# Patient Record
Sex: FEMALE | Race: WHITE | NOT HISPANIC OR LATINO | Employment: OTHER | URBAN - NONMETROPOLITAN AREA
[De-identification: names, ages, dates, MRNs, and addresses within clinical notes are randomized per-mention and may not be internally consistent; named-entity substitution may affect disease eponyms.]

---

## 2017-04-21 ENCOUNTER — OFFICE VISIT (OUTPATIENT)
Dept: CARDIOLOGY | Facility: CLINIC | Age: 82
End: 2017-04-21

## 2017-04-21 VITALS
SYSTOLIC BLOOD PRESSURE: 124 MMHG | DIASTOLIC BLOOD PRESSURE: 70 MMHG | HEIGHT: 63 IN | BODY MASS INDEX: 25.16 KG/M2 | HEART RATE: 85 BPM | WEIGHT: 142 LBS

## 2017-04-21 DIAGNOSIS — I44.2 COMPLETE HEART BLOCK (HCC): Primary | ICD-10-CM

## 2017-04-21 DIAGNOSIS — I49.5 SSS (SICK SINUS SYNDROME) (HCC): ICD-10-CM

## 2017-04-21 DIAGNOSIS — I48.20 CHRONIC ATRIAL FIBRILLATION (HCC): ICD-10-CM

## 2017-04-21 PROBLEM — Z95.0 PACEMAKER: Status: ACTIVE | Noted: 2017-04-21

## 2017-04-21 PROCEDURE — 93280 PM DEVICE PROGR EVAL DUAL: CPT | Performed by: PHYSICIAN ASSISTANT

## 2017-04-21 NOTE — PROGRESS NOTES
Primary Provider:  Abisai Talyor MD  CC:Afib    Problem List:  Patient Active Problem List   Diagnosis   • Paroxysmal atrial fibrillation   • Coronary artery disease involving native coronary artery of native heart without angina pectoris   • Chronic fatigue   • Essential hypertension   • Hypercholesteremia   • SSS (sick sinus syndrome)   • Ischemic heart disease   • H/O hiatal hernia   • Macular degeneration of both eyes   • Exertional dyspnea   • Tachyarrhythmia   • Complete heart block   • Chronic atrial fibrillation   • Pacemaker       HPI:  The patient presents for follow up regarding chronic afib, pacemaker.     Allergies   Allergen Reactions   • Codeine      Nausea and vomiting   • Lotrel [Amlodipine Besy-Benazepril Hcl]      cough   • Mobic [Meloxicam]    • Sular [Nisoldipine Er]    • Sulfa Antibiotics        Current Outpatient Prescriptions   Medication Sig Dispense Refill   • acetaminophen (TYLENOL) 500 MG tablet Take 500 mg by mouth every 6 (six) hours as needed for mild pain (1-3).     • albuterol (PROAIR HFA) 108 (90 BASE) MCG/ACT inhaler Inhale 2 puffs every 4 (four) hours as needed for wheezing.     • apixaban (ELIQUIS) 5 MG tablet tablet Take 5 mg by mouth 2 (Two) Times a Day.     • aspirin 81 MG EC tablet Take 81 mg by mouth daily.     • atorvastatin (LIPITOR) 40 MG tablet Take 40 mg by mouth daily.     • bisacodyl (FLEET) 10 MG/30ML enema Insert 10 mg into the rectum As Needed.     • carvedilol (COREG) 6.25 MG tablet Take 6.25 mg by mouth 2 (two) times a day with meals.     • Coenzyme Q10 (CO Q10) 60 MG capsule Take  by mouth daily.     • cyanocobalamin 1000 MCG/ML injection Inject 1,000 mcg into the shoulder, thigh, or buttocks every 28 (twenty-eight) days.     • Dextromethorphan-Guaifenesin (ROBITUSSIN DM SUGAR FREE PO) Take  by mouth Every 4 (Four) Hours.     • docusate sodium (COLACE) 250 MG capsule Take 250 mg by mouth daily.     • famotidine (PEPCID) 40 MG tablet Take 40 mg by mouth  "daily.     • ferrous sulfate 325 (65 FE) MG tablet Take 325 mg by mouth daily with breakfast.     • furosemide (LASIX) 40 MG tablet Take 40 mg by mouth 2 (Two) Times a Day.     • hydrALAZINE (APRESOLINE) 50 MG tablet Take 50 mg by mouth 2 (Two) Times a Day.     • lidocaine (LIDODERM) 5 % Place 1 patch on the skin every day. Remove & Discard patch within 12 hours or as directed by MD     • magnesium hydroxide (MILK OF MAGNESIA) 400 MG/5ML suspension Take  by mouth daily as needed for constipation.     • montelukast (SINGULAIR) 10 MG tablet Take 10 mg by mouth every night.     • polyethyl glycol-propyl glycol (SYSTANE) 0.4-0.3 % solution ophthalmic solution every 1 (one) hour as needed.     • prednisoLONE acetate (PRED FORTE) 1 % ophthalmic suspension 1 drop Daily.     • promethazine (PHENERGAN) 12.5 MG tablet 12.5 mg every 6 (six) hours as needed for nausea or vomiting.     • sodium chloride (OCEAN) 0.65 % nasal spray 1 spray into each nostril as needed for congestion.     • spironolactone (ALDACTONE) 25 MG tablet Take 25 mg by mouth Daily.     • valsartan (DIOVAN) 320 MG tablet Take 320 mg by mouth daily.       No current facility-administered medications for this visit.        /70 (BP Location: Right arm, Patient Position: Sitting)  Pulse 85  Ht 63\" (160 cm)  Wt 142 lb (64.4 kg)  LMP  (LMP Unknown)  BMI 25.15 kg/m2      Device Check    Company BSC  Mode DDDR  Lower Rate 75bpm  Upper rate 120bpm         Thresholds    Atrial Pacing [email or web address]  Atrial Sensing 1.6mV  Atrial Impedence 367Ohms  Percent Pacing 0    Right Ventricular Pacing 0.6Volts@0.5ms  Right Ventricular Sensing pacedmV  Right Ventricular Impedence 724Ohms  Percent Pacing 100        Battery Voltage -Volts  Longevity five years  Episodes 100% mode swithc    Reprogramming Change to VVIR Mode    Comments Stable pacemaker check      Diagnosis Plan   1. Complete heart block  Stable p[acemaker check.  Will return for follow up in 6 months.  "   2. SSS (sick sinus syndrome)     3. Chronic atrial fibrillation       Will Aubree PA-C

## 2017-06-22 ENCOUNTER — OFFICE VISIT (OUTPATIENT)
Dept: CARDIOLOGY | Facility: CLINIC | Age: 82
End: 2017-06-22

## 2017-06-22 ENCOUNTER — TELEPHONE (OUTPATIENT)
Dept: CARDIOLOGY | Facility: CLINIC | Age: 82
End: 2017-06-22

## 2017-06-22 VITALS — HEART RATE: 76 BPM | DIASTOLIC BLOOD PRESSURE: 56 MMHG | SYSTOLIC BLOOD PRESSURE: 88 MMHG

## 2017-06-22 DIAGNOSIS — Z95.0 PACEMAKER: ICD-10-CM

## 2017-06-22 DIAGNOSIS — I25.9 ISCHEMIC HEART DISEASE: ICD-10-CM

## 2017-06-22 DIAGNOSIS — I48.0 PAROXYSMAL ATRIAL FIBRILLATION (HCC): Primary | ICD-10-CM

## 2017-06-22 DIAGNOSIS — R06.02 SHORTNESS OF BREATH: ICD-10-CM

## 2017-06-22 DIAGNOSIS — E78.00 HYPERCHOLESTEREMIA: ICD-10-CM

## 2017-06-22 DIAGNOSIS — I10 ESSENTIAL HYPERTENSION: ICD-10-CM

## 2017-06-22 DIAGNOSIS — I49.5 SSS (SICK SINUS SYNDROME) (HCC): ICD-10-CM

## 2017-06-22 PROCEDURE — 99213 OFFICE O/P EST LOW 20 MIN: CPT | Performed by: NURSE PRACTITIONER

## 2017-06-22 PROCEDURE — 93000 ELECTROCARDIOGRAM COMPLETE: CPT | Performed by: NURSE PRACTITIONER

## 2017-06-22 RX ORDER — HYDRALAZINE HYDROCHLORIDE 100 MG/1
TABLET, FILM COATED ORAL
Qty: 90 TABLET | Refills: 2
Start: 2017-06-22 | End: 2017-06-22 | Stop reason: DRUGHIGH

## 2017-06-22 RX ORDER — HYDRALAZINE HYDROCHLORIDE 50 MG/1
50 TABLET, FILM COATED ORAL DAILY
Qty: 30 TABLET | Refills: 0 | Status: SHIPPED | OUTPATIENT
Start: 2017-06-22 | End: 2018-07-02 | Stop reason: DRUGHIGH

## 2017-06-22 RX ORDER — BISACODYL 10 MG
10 SUPPOSITORY, RECTAL RECTAL DAILY
COMMUNITY

## 2017-06-22 NOTE — TELEPHONE ENCOUNTER
[facility] called to clarify hydralazine order.  Per Lynn, decrease to 50 mg Daily.

## 2017-06-22 NOTE — PROGRESS NOTES
"Chief Complaint   Patient presents with   • Follow-up     6 month follow-up, PPM last checked 4/21/17 per Dr. Monzon, unable to weigh patient due to patient in wheelchair.   • Shortness of Breath     \"some shortness of breath'       Cardiac Complaints  none      Subjective   Shameka Aguilera is a 84 y.o. female was in the hospital earlier this year with increasing shortness of breath, elevated blood pressure and evidence of pulmonary congestion. Her cardiac enzyme were elevated. Cardiac catheterization showed significant lesion of the RCA and underwent stenting. Her LV function was still within normal limit and she did have moderate MR. She was discharged to nursing home.. Medication management included Plavix and aspirin. She was started on Eliquis by Dr. Monzon and Plavix stopped. She denies signs of bleeding or reoccurrence of significant cardiac symptoms. She does continue to have some shortness of breath, but this has been the same for sometime.  Recent pacemaker check with Dr. Monzon in April showed a 100% mode switching and she was swtiched to VVIR mode, he will follow again in 6 months. She denies any recent blood work, or does not recall when it was done.  No refills are needed.        Cardiac History  Past Surgical History:   Procedure Laterality Date   • ANGIOPLASTY      Remote, atypical, disabling chest pain syndrome with catheterization studies demonstrating severe 2-vessel obstructive coronary artery disease with acceptable left ventricular function with LVEF 0.75 and subsequent angioplasty and stent deployment (LAD-LCX), April 2001.   • APPENDECTOMY     • CARDIAC CATHETERIZATION  04/15/2016    75% RCA- 4.0x8 & 2.5x16 Promus   • CARDIAC CATHETERIZATION  04/2001    Cath-PTCA % Stents in LAD & LCX.   • CARDIAC CATHETERIZATION  02/2003    Cath-Brachytherapy to LCX.   • CARDIAC CATHETERIZATION  02/2004    Cath-PTCA & Stent in LCX.   • CARDIAC CATHETERIZATION  03/12/2008    Cath-705 [id number] " Taxus. 75%RCA-3.5x16mm Taxus.   • CARDIOVASCULAR STRESS TEST  03/07/2013    L.Myoview-negative for ischemia   • CARDIOVASCULAR STRESS TEST  05/02/2008    A. Cardiolyte-([facility]) Negative.   • CHOLECYSTECTOMY      remote   • CONVERTED (HISTORICAL) HOLTER  08/05/2006    Holter-Paced rythm at 78BPM.   • ECHO - CONVERTED  01/17/2007    Echo-EF65%. ApicalWMA.   • ECHO - CONVERTED  04/20/2009    Echo-EF>60%   • ECHO - CONVERTED  03/07/2013    Echo-EF 60-65%, RVSP 47mmHg   • ECHO - CONVERTED  10/31/2011    Echo-EF 60%, RVSP-46mmHg, Mod mR.   • EYE SURGERY Left     Corneal Implant   • OTHER SURGICAL HISTORY      colon polypectomies x 4.   • PACEMAKER IMPLANTATION  02/14/2005    Pacemaker-Guidant by Dr. Monzon   • PACEMAKER REPLACEMENT  10/07/2009    Pacemaker replacement of dual chamber pacemaker. Dr. Monzon.   • TOTAL ABDOMINAL HYSTERECTOMY WITH SALPINGO OOPHORECTOMY         Current Outpatient Prescriptions   Medication Sig Dispense Refill   • acetaminophen (TYLENOL) 500 MG tablet Take 500 mg by mouth every 6 (six) hours as needed for mild pain (1-3).     • albuterol (PROAIR HFA) 108 (90 BASE) MCG/ACT inhaler Inhale 2 puffs every 4 (four) hours as needed for wheezing.     • apixaban (ELIQUIS) 5 MG tablet tablet Take 5 mg by mouth 2 (Two) Times a Day.     • aspirin 81 MG EC tablet Take 81 mg by mouth daily.     • atorvastatin (LIPITOR) 40 MG tablet Take 40 mg by mouth daily.     • bisacodyl (DULCOLAX) 10 MG suppository Insert 10 mg into the rectum Daily. As needed for constipation.     • bisacodyl (FLEET) 10 MG/30ML enema Insert 10 mg into the rectum As Needed.     • carvedilol (COREG) 6.25 MG tablet Take 6.25 mg by mouth 2 (two) times a day with meals.     • Coenzyme Q10 (CO Q10) 60 MG capsule Take  by mouth daily.     • cyanocobalamin 1000 MCG/ML injection Inject 1,000 mcg into the shoulder, thigh, or buttocks every 28 (twenty-eight) days.     • Dextromethorphan-Guaifenesin (ROBITUSSIN DM SUGAR FREE PO) Take  by mouth  Every 4 (Four) Hours.     • docusate sodium (COLACE) 250 MG capsule Take 250 mg by mouth daily.     • famotidine (PEPCID) 40 MG tablet Take 40 mg by mouth daily.     • ferrous sulfate 325 (65 FE) MG tablet Take 325 mg by mouth daily with breakfast.     • furosemide (LASIX) 40 MG tablet Take 40 mg by mouth Daily.     • lidocaine (LIDODERM) 5 % Place 1 patch on the skin every day. Remove & Discard patch within 12 hours or as directed by MD     • loperamide (IMODIUM) 1 MG/5ML solution Take 1 mg by mouth 2 (Two) Times a Day As Needed for Diarrhea (as needed for diarrhea.).     • magnesium hydroxide (MILK OF MAGNESIA) 400 MG/5ML suspension Take  by mouth daily as needed for constipation.     • montelukast (SINGULAIR) 10 MG tablet Take 10 mg by mouth every night.     • polyethyl glycol-propyl glycol (SYSTANE) 0.4-0.3 % solution ophthalmic solution every 1 (one) hour as needed.     • prednisoLONE acetate (PRED FORTE) 1 % ophthalmic suspension 1 drop Daily.     • promethazine (PHENERGAN) 12.5 MG tablet 12.5 mg every 6 (six) hours as needed for nausea or vomiting.     • sodium chloride (OCEAN) 0.65 % nasal spray 1 spray into each nostril as needed for congestion.     • spironolactone (ALDACTONE) 25 MG tablet Take 25 mg by mouth Daily.     • valsartan (DIOVAN) 320 MG tablet Take 320 mg by mouth daily.     • hydrALAZINE (APRESOLINE) 50 MG tablet Take 1 tablet by mouth Daily. 30 tablet 0     No current facility-administered medications for this visit.        Codeine; Lotrel [amlodipine besy-benazepril hcl]; Mobic [meloxicam]; Sular [nisoldipine er]; and Sulfa antibiotics    Past Medical History:   Diagnosis Date   • Abnormal results of thyroid function studies     Remote   • Circumoral numbness     Intermittent transient facial paresthesias- TIA?.    • Complete heart block     w/ normal functioning pacemaker   • Dyslipidemia    • Exertional dyspnea     Chronic exertional dyspnea and fatigue with acceptable normal low  probability lung perfusion/ventilation scan, February 2003.    • Fatigue     Generalized fatigue of unclear etiology, however, this maybe secondary to lung function and advanced age and not due to deficit in cardiac function.  However, we will make arrangements for patient to obtain a 2D echo for evaluation of ejection fraction with Dr. Jerome here in the office.  Otherwise, we will plan on replacing her generator in approximately 1 months time.   • H/O hiatal hernia     Probable GERD syndrome   • Hiatal hernia    • Hypercholesterolemia    • Hypertension     Chronic hypertension, probably essential with minimal obstructive plaque on selective renal arteriography, February 2003.    • IHD (ischemic heart disease)    • Ischemic heart disease    • Knee pain, left     Recent severe left knee pain, swelling and immobility with serial normal left lower extremity venous duplex studies with apparent abnormal x-ray - data deficit (On license of [facility], May-June 2005).    • Macular degeneration of both eyes    • SOB (shortness of breath)    • IZA (stress urinary incontinence, female)    • Tachyarrhythmia     Intermittent recurrent tachypalpitations with documented rapid symptomatic atrial fibrillation with subsequent DDD/R pacemaker implant and AV node ablation (Guidant model #1298), February 2005.   • TIA (transient ischemic attack)     Remote TIA with apparent acceptable carotid duplex study and brain MRI scan, summer 2002.        Social History     Social History   • Marital status:      Spouse name: N/A   • Number of children: N/A   • Years of education: N/A     Occupational History   • Not on file.     Social History Main Topics   • Smoking status: Former Smoker     Quit date: 1966   • Smokeless tobacco: Never Used   • Alcohol use No   • Drug use: No   • Sexual activity: Defer     Other Topics Concern   • Not on file     Social History Narrative       Family History   Problem Relation Age of  Onset   • Diabetes Mother    • Heart disease Mother    • Stroke Mother    • Hypertension Mother        Review of Systems   Constitutional: Negative.    HENT: Negative.    Respiratory: Positive for shortness of breath.    Cardiovascular: Negative.    Gastrointestinal: Negative.    Skin: Negative.    Neurological: Negative.    Psychiatric/Behavioral: Negative.        DiabetesNo  Thyroidnormal    Objective     BP (!) 88/56 (BP Location: Left arm)  Pulse 76    Physical Exam   Constitutional: She is oriented to person, place, and time. She appears well-developed and well-nourished.   HENT:   Head: Normocephalic and atraumatic.   Eyes: EOM are normal. Pupils are equal, round, and reactive to light.   Neck: Normal range of motion. Neck supple.   Cardiovascular: Normal rate.  An irregular rhythm present.   Murmur heard.  Pulmonary/Chest: Effort normal and breath sounds normal.   Abdominal: Soft.   Musculoskeletal: Normal range of motion.   Neurological: She is alert and oriented to person, place, and time.   Skin: Skin is warm and dry.   Psychiatric: She has a normal mood and affect.         ECG 12 Lead  Date/Time: 6/22/2017 2:35 PM  Performed by: JOHNNIE POOL  Authorized by: JOHNNIE POOL   Rhythm: atrial fibrillation and paced  BPM: 75  Clinical impression: abnormal ECG            Assessment/Plan     HR is stable today.  BP is low, we will advise to decrease her hydralazine to ( 1) 50 mg tablet daily.  EKG done today for atrial fibrillation/SSS shows a ventricular paced rhythm and underlying atrial fibrillation.  It is followed by Dr. Monzon.  We will not advise on any repeat workup at present as no new concerns voiced.  She does have some shortness of breath but unchanged from prior.  She is unsure of recent labs, but she thinks you are following.  Could we get next copy?  Refills with you.  Good cardiac diet and limited sodium intake advised.  Please continue with 3 times a week daily weights.  6 month follow  up advised or sooner if needed.      Problems Addressed this Visit        Cardiovascular and Mediastinum    Paroxysmal atrial fibrillation - Primary    Relevant Orders    ECG 12 Lead    Essential hypertension    Hypercholesteremia    SSS (sick sinus syndrome)    Relevant Orders    ECG 12 Lead    Ischemic heart disease    Pacemaker      Other Visit Diagnoses     Shortness of breath                      Electronically signed by ALEYDA King June 22, 2017 4:10 PM

## 2017-12-15 ENCOUNTER — OFFICE VISIT (OUTPATIENT)
Dept: CARDIOLOGY | Facility: CLINIC | Age: 82
End: 2017-12-15

## 2017-12-15 VITALS
HEIGHT: 55 IN | HEART RATE: 64 BPM | SYSTOLIC BLOOD PRESSURE: 100 MMHG | WEIGHT: 143 LBS | BODY MASS INDEX: 33.09 KG/M2 | DIASTOLIC BLOOD PRESSURE: 64 MMHG

## 2017-12-15 DIAGNOSIS — I48.20 CHRONIC ATRIAL FIBRILLATION (HCC): ICD-10-CM

## 2017-12-15 DIAGNOSIS — Z95.0 PACEMAKER: Primary | ICD-10-CM

## 2017-12-15 DIAGNOSIS — I44.2 COMPLETE HEART BLOCK (HCC): ICD-10-CM

## 2017-12-15 DIAGNOSIS — I10 ESSENTIAL HYPERTENSION: ICD-10-CM

## 2017-12-15 PROCEDURE — 93280 PM DEVICE PROGR EVAL DUAL: CPT | Performed by: INTERNAL MEDICINE

## 2017-12-15 PROCEDURE — 99213 OFFICE O/P EST LOW 20 MIN: CPT | Performed by: INTERNAL MEDICINE

## 2017-12-15 NOTE — PROGRESS NOTES
Subjective:   Shameka Aguilera  5/22/1933  [phone]      12/15/2017    [facility] CARDIOLOGY    Abisai Taylor MD  [address] [place], [place] [id number]    REFERRING DOCTOR: LACY Jerome      Patient ID: Shameka Aguilera is a 84 y.o. female.    Chief Complaint: Chronic Afib, PPM    Problem List:  Patient Active Problem List   Diagnosis   • Chronic atrial fibrillation   • Coronary artery disease involving native coronary artery of native heart without angina pectoris   • Chronic fatigue   • Essential hypertension   • Hypercholesteremia   • SSS (sick sinus syndrome)   • Ischemic heart disease   • H/O hiatal hernia   • Macular degeneration of both eyes   • Exertional dyspnea   • Tachyarrhythmia   • Complete heart block   • Chronic atrial fibrillation   • Pacemaker for CHB         Allergies   Allergen Reactions   • Codeine      Nausea and vomiting   • Lotrel [Amlodipine Besy-Benazepril Hcl]      cough   • Mobic [Meloxicam]    • Sular [Nisoldipine Er]    • Sulfa Antibiotics        Current Outpatient Prescriptions:   •  acetaminophen (TYLENOL) 500 MG tablet, Take 500 mg by mouth every 6 (six) hours as needed for mild pain (1-3)., Disp: , Rfl:   •  albuterol (PROAIR HFA) 108 (90 BASE) MCG/ACT inhaler, Inhale 2 puffs every 4 (four) hours as needed for wheezing., Disp: , Rfl:   •  apixaban (ELIQUIS) 5 MG tablet tablet, Take 5 mg by mouth 2 (Two) Times a Day., Disp: , Rfl:   •  aspirin 81 MG EC tablet, Take 81 mg by mouth daily., Disp: , Rfl:   •  atorvastatin (LIPITOR) 40 MG tablet, Take 40 mg by mouth daily., Disp: , Rfl:   •  bisacodyl (DULCOLAX) 10 MG suppository, Insert 10 mg into the rectum Daily. As needed for constipation., Disp: , Rfl:   •  bisacodyl (FLEET) 10 MG/30ML enema, Insert 10 mg into the rectum As Needed., Disp: , Rfl:   •  carvedilol (COREG) 6.25 MG tablet, Take 6.25 mg by mouth 2 (two) times a day with meals., Disp: , Rfl:   •  Coenzyme Q10 (CO Q10) 60 MG capsule,  Take  by mouth daily., Disp: , Rfl:   •  cyanocobalamin 1000 MCG/ML injection, Inject 1,000 mcg into the shoulder, thigh, or buttocks every 28 (twenty-eight) days., Disp: , Rfl:   •  Dextromethorphan-Guaifenesin (ROBITUSSIN DM SUGAR FREE PO), Take  by mouth Every 4 (Four) Hours., Disp: , Rfl:   •  docusate sodium (COLACE) 250 MG capsule, Take 250 mg by mouth daily., Disp: , Rfl:   •  famotidine (PEPCID) 40 MG tablet, Take 40 mg by mouth daily., Disp: , Rfl:   •  ferrous sulfate 325 (65 FE) MG tablet, Take 325 mg by mouth daily with breakfast., Disp: , Rfl:   •  furosemide (LASIX) 40 MG tablet, Take 40 mg by mouth Daily., Disp: , Rfl:   •  hydrALAZINE (APRESOLINE) 50 MG tablet, Take 1 tablet by mouth Daily., Disp: 30 tablet, Rfl: 0  •  lidocaine (LIDODERM) 5 %, Place 1 patch on the skin every day. Remove & Discard patch within 12 hours or as directed by MD, Disp: , Rfl:   •  loperamide (IMODIUM) 1 MG/5ML solution, Take 1 mg by mouth 2 (Two) Times a Day As Needed for Diarrhea (as needed for diarrhea.)., Disp: , Rfl:   •  magnesium hydroxide (MILK OF MAGNESIA) 400 MG/5ML suspension, Take  by mouth daily as needed for constipation., Disp: , Rfl:   •  montelukast (SINGULAIR) 10 MG tablet, Take 10 mg by mouth every night., Disp: , Rfl:   •  polyethyl glycol-propyl glycol (SYSTANE) 0.4-0.3 % solution ophthalmic solution, every 1 (one) hour as needed., Disp: , Rfl:   •  prednisoLONE acetate (PRED FORTE) 1 % ophthalmic suspension, 1 drop Daily., Disp: , Rfl:   •  promethazine (PHENERGAN) 12.5 MG tablet, 12.5 mg every 6 (six) hours as needed for nausea or vomiting., Disp: , Rfl:   •  sodium chloride (OCEAN) 0.65 % nasal spray, 1 spray into each nostril as needed for congestion., Disp: , Rfl:   •  spironolactone (ALDACTONE) 25 MG tablet, Take 25 mg by mouth Daily., Disp: , Rfl:   •  valsartan (DIOVAN) 320 MG tablet, Take 320 mg by mouth daily., Disp: , Rfl:     History of Present Illness  Patient is an 84 year old female here  "today for follow-up visit status post pacemaker in the past and for chronic atrial fibrillation.  Pacemaker was placed originally for complete heart block. Overall doing ok but having some left sided flank pain noted. In a wheelchair and leaves in a NH at that time.     No issues with chest pain, shortness of breath, fevers, chills, night sweats, PND, orthopnea or palpitations. No recent ER visits or hospital stays.      The following portions of the patient's history were reviewed and updated as appropriate: allergies, current medications, past family history, past medical history, past social history, past surgical history and problem list.    ROS   14 point ROS negative except as outlined in problem list, HPI and other parts of the note.      ECG 12 Lead  Date/Time: 12/15/2017 11:32 AM  Performed by: KAYLA KEYS  Authorized by: KAYLA KEYS   Rhythm: atrial fibrillation and paced  Comments:  HR 75 bpm.                Objective:       Vitals:    12/15/17 1057   BP: 100/64   BP Location: Left arm   Patient Position: Sitting   Pulse: 64   Weight: 64.9 kg (143 lb)   Height: 63 cm (24.8\")       GENERAL: elderly female in a wheelchair  HEENT: Normocephalic, atraumatic, PERRLA. Moist mucous membranes.  NECK: No JVD present at 30°. No carotid bruits auscultated.  LUNGS: Clear to auscultation.  CARDIOVASCULAR: Heart has a regular rate and rhythm. No murmurs, gallops or rubs noted.   ABDOMEN: Soft, nontender. Positive bowel sounds.  MUSCULOSKELETAL: No gross deformities. No clubbing, cyanosis, or lower extremity edema.  SKIN: Pink, warm  Neuro: Nonfocal exam. Gait intact  Ext: No edema or bruising  PPm site ok    The patient's old records including ambulatory rhythm recordings (ECGs, Holter/event monitor) were reviewed and discussed.      Lab Review:   Results for orders placed or performed during the hospital encounter of 05/25/16   Basic metabolic panel   Result Value Ref Range    Glucose 110 (H) 70 - 100 mg/dL    " BUN 22 9 - 23 mg/dL    Creatinine 1.1 0.6 - 1.3 mg/dL    Sodium 133 132 - 146 mmol/L    Potassium 4.4 3.5 - 5.5 mmol/L    Chloride 97 (L) 99 - 109 mmol/L    CO2 29 20 - 31 mmol/L    Calcium 8.3 (L) 8.7 - 10.4 mg/dL    Est GFR by Clearance 47 ml/min/1.732    Anion Gap 7 3 - 11 mmol/L   [employer] dual-chamber pacemaker.  Set at VVIR 75 bpm.  In chronic atrial fibrillation with rates controlled.  RV paced 100%.  Threshold 0.8 V at 0.4 ms.  RV lead impedance 725 ohms.  RA impedance 249 ohms.  6.5 years left on battery.  No changes or events noted.        Diagnosis:   1. Pacemaker  2. Essential hypertension  3. Complete heart block  4. Chronic atrial fibrillation      Assessment & Plan:   1. Chronic Atrial Fibrillation with CHB s/p PPM in the past with normal function today. 100% RV paced. On Eliquis 5 mg BID. Age 84, Cr < 1.5 and weights 143 lbs  2. HTN stable today. Continue on current Rx  3. Left Flank Pain -- ? Musculoskeletal in nature ; needs to follow up with primary MD  4. Follow up in 6 mths or sooner as needed         CC: Dr LACY Orozco,   12/15/17  11:30 AM      EMR Dragon/Transcription disclaimer:  Much of this encounter note is an electronic transcription/translation of spoken language to printed text. Electronic translation of spoken language may permit erroneous, or at times, nonsensical words or phrases to be inadvertently transcribed. Although I have reviewed the note for such errors, some may still exist.

## 2018-01-02 ENCOUNTER — OFFICE VISIT (OUTPATIENT)
Dept: CARDIOLOGY | Facility: CLINIC | Age: 83
End: 2018-01-02

## 2018-01-02 VITALS
SYSTOLIC BLOOD PRESSURE: 110 MMHG | HEIGHT: 63 IN | BODY MASS INDEX: 26.4 KG/M2 | DIASTOLIC BLOOD PRESSURE: 60 MMHG | HEART RATE: 72 BPM | WEIGHT: 149 LBS

## 2018-01-02 DIAGNOSIS — Z95.0 PACEMAKER: ICD-10-CM

## 2018-01-02 DIAGNOSIS — I10 ESSENTIAL HYPERTENSION: ICD-10-CM

## 2018-01-02 DIAGNOSIS — I25.9 ISCHEMIC HEART DISEASE: ICD-10-CM

## 2018-01-02 DIAGNOSIS — E78.00 HYPERCHOLESTEREMIA: ICD-10-CM

## 2018-01-02 DIAGNOSIS — I49.5 SSS (SICK SINUS SYNDROME) (HCC): Primary | ICD-10-CM

## 2018-01-02 DIAGNOSIS — I48.20 CHRONIC ATRIAL FIBRILLATION (HCC): ICD-10-CM

## 2018-01-02 DIAGNOSIS — Z79.01 CURRENT USE OF LONG TERM ANTICOAGULATION: ICD-10-CM

## 2018-01-02 PROCEDURE — 93000 ELECTROCARDIOGRAM COMPLETE: CPT | Performed by: NURSE PRACTITIONER

## 2018-01-02 PROCEDURE — 99213 OFFICE O/P EST LOW 20 MIN: CPT | Performed by: NURSE PRACTITIONER

## 2018-01-02 RX ORDER — CLOPIDOGREL BISULFATE 75 MG/1
75 TABLET ORAL DAILY
COMMUNITY
End: 2018-04-19 | Stop reason: HOSPADM

## 2018-01-02 RX ORDER — METHOCARBAMOL 500 MG/1
500 TABLET, FILM COATED ORAL 4 TIMES DAILY PRN
COMMUNITY
End: 2019-07-09 | Stop reason: ALTCHOICE

## 2018-01-02 RX ORDER — POLYETHYLENE GLYCOL 3350 17 G/17G
17 POWDER, FOR SOLUTION ORAL DAILY PRN
COMMUNITY

## 2018-01-02 RX ORDER — ACETAMINOPHEN 650 MG/1
650 SUPPOSITORY RECTAL EVERY 6 HOURS PRN
COMMUNITY

## 2018-01-02 RX ORDER — LACTULOSE 10 G/15ML
20 SOLUTION ORAL DAILY PRN
COMMUNITY

## 2018-01-02 NOTE — PROGRESS NOTES
"Chief Complaint   Patient presents with   • Follow-up     For cardiac management. She reports was recently in hospital for UTI. States has had some pains to left upper chest feels related to muscle pain.   • Device check     Last [employer] PPM checked 12/15/17.   • Dizziness     She states \"has been going off and on for while\".   • Shortness of Breath     only if she walks a lot, this is nothing new.       Subjective       Shameka Aguilera is a 84 y.o. female with a history of IHD, sick sinus syndrome and chronic atrial fibrillation. She has undergone numerous caths with angioplasty and stenting, the last cath being in 2016 showed significant lesion of the RCA and underwent stenting. Her LV function was still within normal limit and she did have moderate MR noted. Permanent pacemaker was placed in 2005 and she now follows with Dr. Orozco. Pacemaker interrogation in December 2017 noted to be normal.   Today she comes to the office for a follow up visit. Patient reports she was recently in hospital for treatment of UTI. Prior to hospitalization she noticed increased shortness of breath during physical therapy sessions. Now, the shortness of breath has improved. She is very happy to be walking again with use of cane and assistance. No cardiac chest pain noted. Occasionally, she has mild dizziness but this is not a new or worsening symptom. No issues with swelling reported.     HPI         Cardiac History:    Past Surgical History:   Procedure Laterality Date   • ANGIOPLASTY      Remote, atypical, disabling chest pain syndrome with catheterization studies demonstrating severe 2-vessel obstructive coronary artery disease with acceptable left ventricular function with LVEF 0.75 and subsequent angioplasty and stent deployment (LAD-LCX), April 2001.   • APPENDECTOMY     • CARDIAC CATHETERIZATION  04/15/2016    75% RCA- 4.0x8 & 2.5x16 Promus   • CARDIAC CATHETERIZATION  04/2001    Cath-PTCA % Stents in LAD & LCX.   • " CARDIAC CATHETERIZATION  02/2003    Cath-Brachytherapy to LCX.   • CARDIAC CATHETERIZATION  02/2004    Cath-PTCA & Stent in LCX.   • CARDIAC CATHETERIZATION  03/12/2008    Cath-705 LCX-[id number] Taxus. 75%RCA-3.5x16mm Taxus.   • CARDIOVASCULAR STRESS TEST  03/07/2013    L.Myoview-negative for ischemia   • CARDIOVASCULAR STRESS TEST  05/02/2008    A. Cardiolyte-([facility]) Negative.   • CHOLECYSTECTOMY      remote   • CONVERTED (HISTORICAL) HOLTER  08/05/2006    Holter-Paced rythm at 78BPM.   • ECHO - CONVERTED  01/17/2007    Echo-EF65%. ApicalWMA.   • ECHO - CONVERTED  04/20/2009    Echo-EF>60%   • ECHO - CONVERTED  03/07/2013    Echo-EF 60-65%, RVSP 47mmHg   • ECHO - CONVERTED  10/31/2011    Echo-EF 60%, RVSP-46mmHg, Mod mR.   • EYE SURGERY Left     Corneal Implant   • OTHER SURGICAL HISTORY      colon polypectomies x 4.   • PACEMAKER IMPLANTATION  02/14/2005    Pacemaker-Guidant by Dr. Monzon   • PACEMAKER REPLACEMENT  10/07/2009    Pacemaker replacement of dual chamber pacemaker. Dr. Monzon.   • TOTAL ABDOMINAL HYSTERECTOMY WITH SALPINGO OOPHORECTOMY         Current Outpatient Prescriptions   Medication Sig Dispense Refill   • acetaminophen (TYLENOL) 500 MG tablet Take 500 mg by mouth every 6 (six) hours as needed for mild pain (1-3).     • acetaminophen (TYLENOL) 650 MG suppository Insert 650 mg into the rectum Every 6 (Six) Hours As Needed for Mild Pain .     • albuterol (PROAIR HFA) 108 (90 BASE) MCG/ACT inhaler Inhale 2 puffs every 4 (four) hours as needed for wheezing.     • apixaban (ELIQUIS) 5 MG tablet tablet Take 5 mg by mouth 2 (Two) Times a Day.     • aspirin 81 MG EC tablet Take 81 mg by mouth daily.     • atorvastatin (LIPITOR) 40 MG tablet Take 40 mg by mouth daily.     • bisacodyl (DULCOLAX) 10 MG suppository Insert 10 mg into the rectum Daily. As needed for constipation.     • carvedilol (COREG) 6.25 MG tablet Take 6.25 mg by mouth 2 (two) times a day with meals.     • clopidogrel (PLAVIX) 75 MG  tablet Take 75 mg by mouth Daily.     • Coenzyme Q10 (CO Q10) 60 MG capsule Take  by mouth daily.     • cyanocobalamin 1000 MCG/ML injection Inject 1,000 mcg into the shoulder, thigh, or buttocks every 28 (twenty-eight) days.     • Dextromethorphan-Guaifenesin (ROBITUSSIN DM SUGAR FREE PO) Take  by mouth Every 4 (Four) Hours.     • docusate sodium (COLACE) 250 MG capsule Take 250 mg by mouth daily.     • famotidine (PEPCID) 40 MG tablet Take 40 mg by mouth daily.     • ferrous sulfate 325 (65 FE) MG tablet Take 325 mg by mouth 2 (Two) Times a Day.     • furosemide (LASIX) 40 MG tablet Take 40 mg by mouth Daily.     • hydrALAZINE (APRESOLINE) 50 MG tablet Take 1 tablet by mouth Daily. 30 tablet 0   • lactulose (CHRONULAC) 10 GM/15ML solution Take 20 g by mouth Daily As Needed.     • lidocaine (LIDODERM) 5 % Place 1 patch on the skin every day. Remove & Discard patch within 12 hours or as directed by MD     • magnesium hydroxide (MILK OF MAGNESIA) 400 MG/5ML suspension Take  by mouth daily as needed for constipation.     • methocarbamol (ROBAXIN) 500 MG tablet Take 500 mg by mouth 4 (Four) Times a Day As Needed for Muscle Spasms.     • montelukast (SINGULAIR) 10 MG tablet Take 10 mg by mouth every night.     • polyethyl glycol-propyl glycol (SYSTANE) 0.4-0.3 % solution ophthalmic solution 1 drop 3 (Three) Times a Day.     • polyethylene glycol (MIRALAX) packet Take 17 g by mouth Daily As Needed.     • prednisoLONE acetate (PRED FORTE) 1 % ophthalmic suspension 1 drop Daily.     • promethazine (PHENERGAN) 12.5 MG tablet 12.5 mg every 6 (six) hours as needed for nausea or vomiting.     • sodium chloride (OCEAN) 0.65 % nasal spray 1 spray into each nostril as needed for congestion.     • spironolactone (ALDACTONE) 25 MG tablet Take 25 mg by mouth Daily.     • valsartan (DIOVAN) 320 MG tablet Take 320 mg by mouth daily.       No current facility-administered medications for this visit.        Codeine; Lotrel [amlodipine  besy-benazepril hcl]; Mobic [meloxicam]; Morphine and related; Sular [nisoldipine er]; and Sulfa antibiotics    Past Medical History:   Diagnosis Date   • Abnormal results of thyroid function studies     Remote   • Anemia    • CAD (coronary artery disease)    • CHF (congestive heart failure)    • Circumoral numbness     Intermittent transient facial paresthesias- TIA?.    • Complete heart block     w/ normal functioning pacemaker   • Dyslipidemia    • Exertional dyspnea     Chronic exertional dyspnea and fatigue with acceptable normal low probability lung perfusion/ventilation scan, February 2003.    • Fatigue     Generalized fatigue of unclear etiology, however, this maybe secondary to lung function and advanced age and not due to deficit in cardiac function.  However, we will make arrangements for patient to obtain a 2D echo for evaluation of ejection fraction with Dr. Jerome here in the office.  Otherwise, we will plan on replacing her generator in approximately 1 months time.   • GERD (gastroesophageal reflux disease)    • H/O hiatal hernia     Probable GERD syndrome   • Hiatal hernia    • Hypercholesterolemia    • Hypertension     Chronic hypertension, probably essential with minimal obstructive plaque on selective renal arteriography, February 2003.    • IHD (ischemic heart disease)    • Ischemic heart disease    • Knee pain, left     Recent severe left knee pain, swelling and immobility with serial normal left lower extremity venous duplex studies with apparent abnormal x-ray - data deficit ([facility], May-June 2005).    • Macular degeneration of both eyes    • SOB (shortness of breath)    • IZA (stress urinary incontinence, female)    • Tachyarrhythmia     Intermittent recurrent tachypalpitations with documented rapid symptomatic atrial fibrillation with subsequent DDD/R pacemaker implant and AV node ablation (Guidant model #1298), February 2005.   • TIA (transient ischemic  attack)     Remote TIA with apparent acceptable carotid duplex study and brain MRI scan, summer 2002.    • UTI (urinary tract infection)        Social History     Social History   • Marital status:      Spouse name: N/A   • Number of children: N/A   • Years of education: N/A     Occupational History   • Not on file.     Social History Main Topics   • Smoking status: Former Smoker     Quit date: 1966   • Smokeless tobacco: Never Used   • Alcohol use No   • Drug use: No   • Sexual activity: Defer     Other Topics Concern   • Not on file     Social History Narrative       Family History   Problem Relation Age of Onset   • Diabetes Mother    • Heart disease Mother    • Stroke Mother    • Hypertension Mother        Review of Systems   Constitution: Positive for malaise/fatigue. Negative for decreased appetite, diaphoresis and fever.   HENT: Negative for congestion, nosebleeds and sore throat.    Eyes: Negative for blurred vision and visual disturbance.   Cardiovascular: Negative for chest pain, leg swelling and near-syncope.   Respiratory: Positive for shortness of breath. Negative for cough and sleep disturbances due to breathing.    Endocrine: Negative for cold intolerance and heat intolerance.   Hematologic/Lymphatic: Negative for adenopathy. Does not bruise/bleed easily.   Skin: Negative for itching and nail changes.   Musculoskeletal: Positive for arthritis and muscle weakness (legs, currently in therapy). Negative for falls and myalgias.   Gastrointestinal: Negative for abdominal pain, dysphagia, heartburn, melena and nausea.   Genitourinary: Positive for flank pain (at times). Negative for dysuria, frequency and hematuria.   Neurological: Positive for dizziness. Negative for light-headedness, seizures and vertigo.   Psychiatric/Behavioral: Negative for altered mental status.   Allergic/Immunologic: Negative for hives.      Diabetes- No  Thyroid-normal    Objective     /60 (BP Location: Right arm)   "Pulse 72  Ht 160 cm (63\")  Wt 67.6 kg (149 lb)  BMI 26.39 kg/m2    Physical Exam   Constitutional: She is oriented to person, place, and time. She appears well-nourished.   HENT:   Head: Normocephalic.   Eyes: Conjunctivae are normal. Pupils are equal, round, and reactive to light.   Neck: Normal range of motion. Neck supple. No JVD present. Carotid bruit is not present.   Cardiovascular: Normal rate, regular rhythm, S1 normal and S2 normal.    No murmur heard.  Pulses:       Radial pulses are 2+ on the right side, and 2+ on the left side.        Dorsalis pedis pulses are 2+ on the right side, and 2+ on the left side.   Pulmonary/Chest: Effort normal and breath sounds normal. She has no wheezes. She has no rales.   Abdominal: Soft. Bowel sounds are normal. She exhibits no distension. There is no tenderness.   Musculoskeletal: Normal range of motion. She exhibits no edema.   Neurological: She is alert and oriented to person, place, and time.   Skin: Skin is warm and dry.   Psychiatric: She has a normal mood and affect.        ECG 12 Lead  Date/Time: 1/2/2018 2:54 PM  Performed by: YESSY TURNER  Authorized by: YESSY TURNER   Rhythm: atrial fibrillation and paced  Rate: normal  BPM: 75                  Assessment/Plan      Shameka was seen today for follow-up, device check, dizziness and shortness of breath.    Diagnoses and all orders for this visit:    SSS (sick sinus syndrome)  -     ECG 12 Lead    Ischemic heart disease    Chronic atrial fibrillation  -     ECG 12 Lead    Pacemaker  -     ECG 12 Lead    Essential hypertension    Hypercholesteremia    Current use of long term anticoagulation      For management of pacemaker an EKG done today that shows atrial fib with ventricular pacing. Dr. Orozco managing pacemaker. MELONIE score 4, advise to continue Eliquis and monitor for any signs of bleeding.  Her blood pressure is normal. I encouraged her on physical therapy, which she reports effort tolerance " improving. No medication changes recommended. No cardiac testing advised. Labs managed by PCP.    We will plan to see her back in 6 months or sooner for problems.              Electronically signed by ALEYDA Winter,  January 2, 2018 3:16 PM

## 2018-04-19 ENCOUNTER — OFFICE VISIT (OUTPATIENT)
Dept: CARDIOLOGY | Facility: CLINIC | Age: 83
End: 2018-04-19

## 2018-04-19 VITALS — HEIGHT: 63 IN | HEART RATE: 80 BPM | DIASTOLIC BLOOD PRESSURE: 64 MMHG | SYSTOLIC BLOOD PRESSURE: 110 MMHG

## 2018-04-19 DIAGNOSIS — I25.10 CORONARY ARTERY DISEASE INVOLVING NATIVE CORONARY ARTERY OF NATIVE HEART WITHOUT ANGINA PECTORIS: ICD-10-CM

## 2018-04-19 DIAGNOSIS — R53.82 CHRONIC FATIGUE: Primary | ICD-10-CM

## 2018-04-19 DIAGNOSIS — Z79.899 MEDICATION MANAGEMENT: ICD-10-CM

## 2018-04-19 DIAGNOSIS — I49.5 SSS (SICK SINUS SYNDROME) (HCC): ICD-10-CM

## 2018-04-19 DIAGNOSIS — Z79.01 CURRENT USE OF LONG TERM ANTICOAGULATION: ICD-10-CM

## 2018-04-19 DIAGNOSIS — I10 ESSENTIAL HYPERTENSION: ICD-10-CM

## 2018-04-19 DIAGNOSIS — E78.00 HYPERCHOLESTEREMIA: ICD-10-CM

## 2018-04-19 DIAGNOSIS — Z95.0 PACEMAKER: ICD-10-CM

## 2018-04-19 PROCEDURE — 93000 ELECTROCARDIOGRAM COMPLETE: CPT | Performed by: NURSE PRACTITIONER

## 2018-04-19 PROCEDURE — 99214 OFFICE O/P EST MOD 30 MIN: CPT | Performed by: NURSE PRACTITIONER

## 2018-04-19 RX ORDER — SIMETHICONE 80 MG
80 TABLET,CHEWABLE ORAL EVERY 6 HOURS PRN
COMMUNITY
End: 2018-07-02 | Stop reason: ALTCHOICE

## 2018-04-19 RX ORDER — OXYMETAZOLINE HYDROCHLORIDE 0.05 G/100ML
2 SPRAY NASAL 4 TIMES DAILY
COMMUNITY
End: 2018-07-02 | Stop reason: ALTCHOICE

## 2018-04-19 RX ORDER — ONDANSETRON 4 MG/1
4 TABLET, FILM COATED ORAL EVERY 8 HOURS PRN
COMMUNITY

## 2018-04-19 NOTE — PROGRESS NOTES
Chief Complaint   Patient presents with   • Follow-up     Dr. Alford wanted patient seen due to nose bleed requiring ER visit. Patient's Eliquis and Plavix. unable to weigh patient due to in wheelchair.   • Shortness of Breath     reports short of breath last thursday during episodes with nose bleed.       Subjective       Shameka Aguilera is a 84 y.o. female with history of hypertension, hypercholesterolemia and IHD with stenting in the past. IN 2016, she was admitted with increasd shortness of breath, increased blood pressure and pulmonary congestion. Her cardiac enzyme were elevated. Cardiac catheterization showed significant lesion of the RCA and underwent stenting. Her LV function was still within normal limit and she did have moderate MR. She was discharged to nursing home. Medication management included Plavix and aspirin. She was started on Eliquis by EP due to atrial fib and Plavix stopped. She now follows with Dr. Orozco. December 2017 pacemaker interrogation showed VVRI sitting at 75 due to chronic atrial fib, function was normal and no changes made.   Today she comes to the office due to recent issues with nose bleeds which required two emergency department visits. Dr. Alford called the office to set up appointment for cardiac management of anticoagulation therapy. Ms. Aguilera denies reoccurrence of nose bleed since 4/12 and has seen ENT specialist. She is using nose spray daily. She reports issues with constipation but no signs of bleeding. Bruises noted on right arm from previous needle sticks, no inappropriate bruising noted.     HPI: as noted     Cardiac History:    Past Surgical History:   Procedure Laterality Date   • ANGIOPLASTY      Remote, atypical, disabling chest pain syndrome with catheterization studies demonstrating severe 2-vessel obstructive coronary artery disease with acceptable left ventricular function with LVEF 0.75 and subsequent angioplasty and stent deployment (LAD-LCX), April 2001.    • APPENDECTOMY     • CARDIAC CATHETERIZATION  04/15/2016    75% RCA- 4.0x8 & 2.5x16 Promus   • CARDIAC CATHETERIZATION  04/2001    Cath-PTCA % Stents in LAD & LCX.   • CARDIAC CATHETERIZATION  02/2003    Cath-Brachytherapy to LCX.   • CARDIAC CATHETERIZATION  02/2004    Cath-PTCA & Stent in LCX.   • CARDIAC CATHETERIZATION  03/12/2008    Cath-705 LCX-[id number] Taxus. 75%RCA-3.5x16mm Taxus.   • CARDIOVASCULAR STRESS TEST  03/07/2013    L.Myoview-negative for ischemia   • CARDIOVASCULAR STRESS TEST  05/02/2008    A. Cardiolyte-([facility]) Negative.   • CHOLECYSTECTOMY      remote   • CONVERTED (HISTORICAL) HOLTER  08/05/2006    Holter-Paced rythm at 78BPM.   • ECHO - CONVERTED  01/17/2007    Echo-EF65%. ApicalWMA.   • ECHO - CONVERTED  04/20/2009    Echo-EF>60%   • ECHO - CONVERTED  03/07/2013    Echo-EF 60-65%, RVSP 47mmHg   • ECHO - CONVERTED  10/31/2011    Echo-EF 60%, RVSP-46mmHg, Mod mR.   • EYE SURGERY Left     Corneal Implant   • OTHER SURGICAL HISTORY      colon polypectomies x 4.   • PACEMAKER IMPLANTATION  02/14/2005    Pacemaker-Guidant by Dr. Monzon   • PACEMAKER REPLACEMENT  10/07/2009    Pacemaker replacement of dual chamber pacemaker. Dr. Monzon.   • TOTAL ABDOMINAL HYSTERECTOMY WITH SALPINGO OOPHORECTOMY         Current Outpatient Prescriptions   Medication Sig Dispense Refill   • acetaminophen (TYLENOL) 500 MG tablet Take 500 mg by mouth every 6 (six) hours as needed for mild pain (1-3).     • acetaminophen (TYLENOL) 650 MG suppository Insert 650 mg into the rectum Every 6 (Six) Hours As Needed for Mild Pain .     • atorvastatin (LIPITOR) 40 MG tablet Take 40 mg by mouth daily.     • bisacodyl (DULCOLAX) 10 MG suppository Insert 10 mg into the rectum Daily. As needed for constipation.     • carvedilol (COREG) 6.25 MG tablet Take 6.25 mg by mouth 2 (two) times a day with meals.     • Coenzyme Q10 (CO Q10) 60 MG capsule Take  by mouth daily.     • cyanocobalamin 1000 MCG/ML injection Inject 1,000 mcg  into the shoulder, thigh, or buttocks every 28 (twenty-eight) days.     • Dextromethorphan-Guaifenesin (ROBITUSSIN DM SUGAR FREE PO) Take  by mouth Every 4 (Four) Hours.     • docusate sodium (COLACE) 250 MG capsule Take 250 mg by mouth daily.     • famotidine (PEPCID) 40 MG tablet Take 40 mg by mouth daily.     • ferrous sulfate 325 (65 FE) MG tablet Take 325 mg by mouth 2 (Two) Times a Day.     • furosemide (LASIX) 40 MG tablet Take 40 mg by mouth Daily.     • hydrALAZINE (APRESOLINE) 50 MG tablet Take 1 tablet by mouth Daily. (Patient taking differently: Take 50 mg by mouth 2 (Two) Times a Day.) 30 tablet 0   • Lactobacillus (ACIDOPHILUS/PECTIN PO) Take 1 tablet by mouth 2 (Two) Times a Day.     • lactulose (CHRONULAC) 10 GM/15ML solution Take 20 g by mouth Daily As Needed.     • lidocaine (LIDODERM) 5 % Place 1 patch on the skin every day. Remove & Discard patch within 12 hours or as directed by MD     • magnesium hydroxide (MILK OF MAGNESIA) 400 MG/5ML suspension Take  by mouth daily as needed for constipation.     • methocarbamol (ROBAXIN) 500 MG tablet Take 500 mg by mouth 4 (Four) Times a Day As Needed for Muscle Spasms.     • montelukast (SINGULAIR) 10 MG tablet Take 10 mg by mouth every night.     • ondansetron (ZOFRAN) 4 MG tablet Take 4 mg by mouth Every 8 (Eight) Hours As Needed for Nausea or Vomiting.     • oxymetazoline (AFRIN) 0.05 % nasal spray 2 sprays into each nostril 4 (Four) Times a Day.     • polyethyl glycol-propyl glycol (SYSTANE) 0.4-0.3 % solution ophthalmic solution 1 drop 3 (Three) Times a Day.     • polyethylene glycol (MIRALAX) packet Take 17 g by mouth Daily As Needed.     • prednisoLONE acetate (PRED FORTE) 1 % ophthalmic suspension 1 drop Daily.     • promethazine (PHENERGAN) 12.5 MG tablet 12.5 mg every 6 (six) hours as needed for nausea or vomiting.     • simethicone (MYLICON) 80 MG chewable tablet Chew 80 mg Every 6 (Six) Hours As Needed for Flatulence.     • sodium chloride  (OCEAN) 0.65 % nasal spray 1 spray into each nostril as needed for congestion.     • spironolactone (ALDACTONE) 25 MG tablet Take 25 mg by mouth Daily.     • apixaban (ELIQUIS) 5 MG tablet tablet Take 1 tablet by mouth Every 12 (Twelve) Hours. 60 tablet 6     No current facility-administered medications for this visit.        Codeine; Lotrel [amlodipine besy-benazepril hcl]; Mobic [meloxicam]; Morphine and related; Sular [nisoldipine er]; and Sulfa antibiotics    Past Medical History:   Diagnosis Date   • Abnormal results of thyroid function studies     Remote   • Anemia    • CAD (coronary artery disease)    • CHF (congestive heart failure)    • Circumoral numbness     Intermittent transient facial paresthesias- TIA?.    • Complete heart block     w/ normal functioning pacemaker   • Dyslipidemia    • Exertional dyspnea     Chronic exertional dyspnea and fatigue with acceptable normal low probability lung perfusion/ventilation scan, February 2003.    • Fatigue     Generalized fatigue of unclear etiology, however, this maybe secondary to lung function and advanced age and not due to deficit in cardiac function.  However, we will make arrangements for patient to obtain a 2D echo for evaluation of ejection fraction with Dr. Jerome here in the office.  Otherwise, we will plan on replacing her generator in approximately 1 months time.   • GERD (gastroesophageal reflux disease)    • H/O hiatal hernia     Probable GERD syndrome   • Hiatal hernia    • Hypercholesterolemia    • Hypertension     Chronic hypertension, probably essential with minimal obstructive plaque on selective renal arteriography, February 2003.    • IHD (ischemic heart disease)    • Ischemic heart disease    • Knee pain, left     Recent severe left knee pain, swelling and immobility with serial normal left lower extremity venous duplex studies with apparent abnormal x-ray - data deficit ([facility], May-June 2005).    •  Macular degeneration of both eyes    • SOB (shortness of breath)    • IZA (stress urinary incontinence, female)    • Tachyarrhythmia     Intermittent recurrent tachypalpitations with documented rapid symptomatic atrial fibrillation with subsequent DDD/R pacemaker implant and AV node ablation (Guidant model #1298), February 2005.   • TIA (transient ischemic attack)     Remote TIA with apparent acceptable carotid duplex study and brain MRI scan, summer 2002.    • UTI (urinary tract infection)        Social History     Social History   • Marital status:      Spouse name: N/A   • Number of children: N/A   • Years of education: N/A     Occupational History   • Not on file.     Social History Main Topics   • Smoking status: Former Smoker     Quit date: 1966   • Smokeless tobacco: Never Used   • Alcohol use No   • Drug use: No   • Sexual activity: Defer     Other Topics Concern   • Not on file     Social History Narrative   • No narrative on file       Family History   Problem Relation Age of Onset   • Diabetes Mother    • Heart disease Mother    • Stroke Mother    • Hypertension Mother        Review of Systems   Constitution: Positive for weakness and malaise/fatigue (same). Negative for chills, decreased appetite and diaphoresis.   HENT: Positive for nosebleeds (not since 4/12). Negative for congestion and sore throat.    Cardiovascular: Positive for leg swelling (mild, not a new symptom). Negative for chest pain, near-syncope and palpitations.   Respiratory: Positive for shortness of breath. Negative for cough, sleep disturbances due to breathing and sputum production.    Endocrine: Negative for polydipsia, polyphagia and polyuria.   Hematologic/Lymphatic: Negative for bleeding problem. Bruises/bleeds easily.   Skin: Negative for color change and itching.   Musculoskeletal: Positive for muscle weakness. Negative for falls.   Gastrointestinal: Positive for constipation (dark stools due to iron) and vomiting (not  "since 4/12). Negative for dysphagia, melena and nausea.   Genitourinary: Negative for dysuria and hematuria.   Neurological: Positive for loss of balance (not a new symptom). Negative for dizziness.   Psychiatric/Behavioral: The patient is nervous/anxious. The patient does not have insomnia.         Objective     /64 (BP Location: Left arm)   Pulse 80   Ht 160 cm (63\")     Physical Exam   Constitutional: She is oriented to person, place, and time. She appears well-developed and well-nourished.   HENT:   Head: Normocephalic.   Eyes: Pupils are equal, round, and reactive to light.   Neck: Normal range of motion. Neck supple. Carotid bruit is not present.   Cardiovascular: Normal rate, regular rhythm, S1 normal and S2 normal.    Murmur heard.   Systolic murmur is present with a grade of 2/6   Pulses:       Radial pulses are 2+ on the right side, and 2+ on the left side.   Pulmonary/Chest: Effort normal and breath sounds normal. She has no wheezes. She has no rales.   Abdominal: Soft. Bowel sounds are normal. She exhibits no distension. There is no tenderness.   Musculoskeletal: Normal range of motion. She exhibits edema (trace lower legs).   Neurological: She is alert and oriented to person, place, and time.   Skin: Skin is warm and dry. There is pallor.   Psychiatric: Her speech is normal and behavior is normal. Thought content normal. Her mood appears anxious.          ECG 12 Lead  Date/Time: 4/19/2018 2:20 PM  Performed by: YESSY TURNER  Authorized by: YESSY TURNER   Comparison: compared with previous ECG from 1/2/2018  Comparison to previous ECG: Atrial fib with ventricular pacing  Rhythm: atrial fibrillation and paced  BPM: 75                  Assessment/Plan      Shameka was seen today for follow-up and shortness of breath.    Diagnoses and all orders for this visit:    Chronic fatigue    SSS (sick sinus syndrome)    Current use of long term anticoagulation    Medication management    Essential " hypertension    Hypercholesteremia    Coronary artery disease involving native coronary artery of native heart without angina pectoris    Pacemaker    Other orders  -     ECG 12 Lead  -     apixaban (ELIQUIS) 5 MG tablet tablet; Take 1 tablet by mouth Every 12 (Twelve) Hours.    EKG for pacemaker management shows atrial fib with ventricular pacing, 75 bpm. She follows with Dr. Orozco for management. Her blood pressure is stable. Continue same dose Coreg. For cholesterol management she is taking LIpitor,advised to continue the same.   HasBled score is 4. CHADVASC score is 6. Her stroke risk score is high. I discussed risk verses benefits with Ms. Aguilera. She wants to continue Eliquis only. Her renal function test has been acceptable. Advised to resume Eliquis at 5 mg bid. Instructed to not take aspirin, Plavix or NSAIDS.   Small pericardial effusion noted. Consider echo next visit. We will keep July appointment already scheduled.            Electronically signed by ALEYDA Winter,  April 19, 2018 4:28 PM

## 2018-06-27 ENCOUNTER — TELEPHONE (OUTPATIENT)
Dept: CARDIOLOGY | Facility: CLINIC | Age: 83
End: 2018-06-27

## 2018-06-27 NOTE — TELEPHONE ENCOUNTER
Received a call from Raquel with [facility] to report that patient came to her saying that she felt her pacemaker flopping throughout the night. She checked her vitals and they were normal, No abnormalities and no pain.    Any recommendations?

## 2018-06-28 NOTE — TELEPHONE ENCOUNTER
Raquel with [facility] was made aware that patient has appointment with Dr. Orozco on 07/06/18 and that they follow her pacemaker.

## 2018-07-02 ENCOUNTER — OFFICE VISIT (OUTPATIENT)
Dept: CARDIOLOGY | Facility: CLINIC | Age: 83
End: 2018-07-02

## 2018-07-02 VITALS
HEART RATE: 75 BPM | DIASTOLIC BLOOD PRESSURE: 60 MMHG | SYSTOLIC BLOOD PRESSURE: 100 MMHG | WEIGHT: 150 LBS | HEIGHT: 63 IN | BODY MASS INDEX: 26.58 KG/M2

## 2018-07-02 DIAGNOSIS — Z95.0 PACEMAKER: ICD-10-CM

## 2018-07-02 DIAGNOSIS — I25.9 ISCHEMIC HEART DISEASE: ICD-10-CM

## 2018-07-02 DIAGNOSIS — I31.39 PERICARDIAL EFFUSION: ICD-10-CM

## 2018-07-02 DIAGNOSIS — R60.0 LOCALIZED EDEMA: ICD-10-CM

## 2018-07-02 DIAGNOSIS — I48.20 CHRONIC ATRIAL FIBRILLATION (HCC): Primary | ICD-10-CM

## 2018-07-02 DIAGNOSIS — I49.5 SSS (SICK SINUS SYNDROME) (HCC): ICD-10-CM

## 2018-07-02 DIAGNOSIS — I25.10 CORONARY ARTERY DISEASE INVOLVING NATIVE CORONARY ARTERY OF NATIVE HEART WITHOUT ANGINA PECTORIS: ICD-10-CM

## 2018-07-02 DIAGNOSIS — R06.02 SHORTNESS OF BREATH: ICD-10-CM

## 2018-07-02 PROCEDURE — 99214 OFFICE O/P EST MOD 30 MIN: CPT | Performed by: NURSE PRACTITIONER

## 2018-07-02 PROCEDURE — 93000 ELECTROCARDIOGRAM COMPLETE: CPT | Performed by: NURSE PRACTITIONER

## 2018-07-02 RX ORDER — HYDRALAZINE HYDROCHLORIDE 50 MG/1
50 TABLET, FILM COATED ORAL DAILY
COMMUNITY

## 2018-07-02 NOTE — PROGRESS NOTES
"Chief Complaint   Patient presents with   • Follow-up     Cardiac management. She states last week when she turned over in the bed it felt like her pacermaker flipped over, and has not felt right since that happen.   • Shortness of Breath     Has had increased when talking. She states \"today I have had more than usual\".   • Pacemaker Check     Last NanoH2O PPM check 12/15/17.       Subjective       Shameka Aguilera is a 85 y.o. female with history of hypertension, hypercholesterolemia and IHD with stenting in the past. IN 2016, she was admitted with increasd shortness of breath, increased blood pressure and pulmonary congestion. Her cardiac enzyme were elevated. Cardiac catheterization showed significant lesion of the RCA and underwent stenting. Her LV function was still within normal limit and she did have moderate MR. She was discharged to nursing home. Medication management included Plavix and aspirin. She was started on Eliquis by EP due to atrial fib and Plavix stopped. She now follows with Dr. Orozco. December 2017 pacemaker interrogation showed VVRI sitting at 75 due to chronic atrial fib, function was normal and no changes made. IN April 2018, she was seen in the office due to recurrent nose bleeds. Due to stroke risk, Ms. Aguilera decided to continue Eliquis and no aspirin.   Today she comes to the office for a follow up visit and denies signs of bleeding. She does admit to shortness of breath and is concerned over heart failure. No chest pain reported. She reports vital signs checked daily at nursing home and blood pressure has \"been good\".     HPI     Cardiac History:    Past Surgical History:   Procedure Laterality Date   • ANGIOPLASTY      Remote, atypical, disabling chest pain syndrome with catheterization studies demonstrating severe 2-vessel obstructive coronary artery disease with acceptable left ventricular function with LVEF 0.75 and subsequent angioplasty and stent deployment (LAD-LCX), " April 2001.   • APPENDECTOMY     • CARDIAC CATHETERIZATION  04/15/2016    75% RCA- 4.0x8 & 2.5x16 Promus   • CARDIAC CATHETERIZATION  04/2001    Cath-PTCA % Stents in LAD & LCX.   • CARDIAC CATHETERIZATION  02/2003    Cath-Brachytherapy to LCX.   • CARDIAC CATHETERIZATION  02/2004    Cath-PTCA & Stent in LCX.   • CARDIAC CATHETERIZATION  03/12/2008    Cath-705 LCX-2.84d29hz Taxus. 75%RCA-3.5x16mm Taxus.   • CARDIOVASCULAR STRESS TEST  03/07/2013    L.Myoview-negative for ischemia   • CARDIOVASCULAR STRESS TEST  05/02/2008    A. Cardiolyte-([facility]) Negative.   • CHOLECYSTECTOMY      remote   • CONVERTED (HISTORICAL) HOLTER  08/05/2006    Holter-Paced rythm at 78BPM.   • ECHO - CONVERTED  01/17/2007    Echo-EF65%. ApicalWMA.   • ECHO - CONVERTED  04/20/2009    Echo-EF>60%   • ECHO - CONVERTED  03/07/2013    Echo-EF 60-65%, RVSP 47mmHg   • ECHO - CONVERTED  10/31/2011    Echo-EF 60%, RVSP-46mmHg, Mod mR.   • EYE SURGERY Left     Corneal Implant   • OTHER SURGICAL HISTORY      colon polypectomies x 4.   • PACEMAKER IMPLANTATION  02/14/2005    Pacemaker-Guidant by Dr. Monzon   • PACEMAKER REPLACEMENT  10/07/2009    Pacemaker replacement of dual chamber pacemaker. Dr. Monzon.   • TOTAL ABDOMINAL HYSTERECTOMY WITH SALPINGO OOPHORECTOMY         Current Outpatient Prescriptions   Medication Sig Dispense Refill   • acetaminophen (TYLENOL) 500 MG tablet Take 500 mg by mouth every 6 (six) hours as needed for mild pain (1-3).     • acetaminophen (TYLENOL) 650 MG suppository Insert 650 mg into the rectum Every 6 (Six) Hours As Needed for Mild Pain .     • apixaban (ELIQUIS) 2.5 MG tablet tablet Take 2.5 mg by mouth 2 (Two) Times a Day.     • atorvastatin (LIPITOR) 40 MG tablet Take 40 mg by mouth daily.     • bisacodyl (DULCOLAX) 10 MG suppository Insert 10 mg into the rectum Daily. As needed for constipation.     • carvedilol (COREG) 6.25 MG tablet Take 6.25 mg by mouth 2 (two) times a day with meals.     • Coenzyme Q10 (CO  Q10) 60 MG capsule Take  by mouth daily.     • cyanocobalamin 1000 MCG/ML injection Inject 1,000 mcg into the shoulder, thigh, or buttocks every 28 (twenty-eight) days.     • Dextromethorphan-Guaifenesin (ROBITUSSIN DM SUGAR FREE PO) Take  by mouth Every 4 (Four) Hours As Needed.     • docusate sodium (COLACE) 250 MG capsule Take 250 mg by mouth daily.     • famotidine (PEPCID) 40 MG tablet Take 40 mg by mouth daily.     • ferrous sulfate 325 (65 FE) MG tablet Take 325 mg by mouth 2 (Two) Times a Day.     • furosemide (LASIX) 40 MG tablet Take 40 mg by mouth Daily.     • hydrALAZINE (APRESOLINE) 50 MG tablet Take 50 mg by mouth Daily.     • Lactobacillus (ACIDOPHILUS/PECTIN PO) Take 1 tablet by mouth 2 (Two) Times a Day.     • lactulose (CHRONULAC) 10 GM/15ML solution Take 20 g by mouth Daily As Needed.     • magnesium hydroxide (MILK OF MAGNESIA) 400 MG/5ML suspension Take  by mouth daily as needed for constipation.     • methocarbamol (ROBAXIN) 500 MG tablet Take 500 mg by mouth 4 (Four) Times a Day As Needed for Muscle Spasms.     • montelukast (SINGULAIR) 10 MG tablet Take 10 mg by mouth every night.     • ondansetron (ZOFRAN) 4 MG tablet Take 4 mg by mouth Every 8 (Eight) Hours As Needed for Nausea or Vomiting.     • polyethyl glycol-propyl glycol (SYSTANE) 0.4-0.3 % solution ophthalmic solution 1 drop 3 (Three) Times a Day.     • polyethylene glycol (MIRALAX) packet Take 17 g by mouth Daily As Needed.     • prednisoLONE acetate (PRED FORTE) 1 % ophthalmic suspension 1 drop Daily.     • promethazine (PHENERGAN) 12.5 MG tablet 12.5 mg every 6 (six) hours as needed for nausea or vomiting.     • sodium chloride (OCEAN) 0.65 % nasal spray 1 spray into each nostril as needed for congestion.     • spironolactone (ALDACTONE) 25 MG tablet Take 25 mg by mouth Daily.       No current facility-administered medications for this visit.        Codeine; Lotrel [amlodipine besy-benazepril hcl]; Mobic [meloxicam]; Morphine and  related; Sular [nisoldipine er]; and Sulfa antibiotics    Past Medical History:   Diagnosis Date   • Abnormal results of thyroid function studies     Remote   • Anemia    • Anxiety    • Asthma    • CAD (coronary artery disease)    • Cardiomegaly    • CHF (congestive heart failure) (CMS/[facility])    • Circumoral numbness     Intermittent transient facial paresthesias- TIA?.    • Complete heart block (CMS/HCC)     w/ normal functioning pacemaker   • COPD (chronic obstructive pulmonary disease) (CMS/[facility])    • Depression    • Diabetes mellitus (CMS/[facility])    • Dyslipidemia    • Exertional dyspnea     Chronic exertional dyspnea and fatigue with acceptable normal low probability lung perfusion/ventilation scan, February 2003.    • Fatigue     Generalized fatigue of unclear etiology, however, this maybe secondary to lung function and advanced age and not due to deficit in cardiac function.  However, we will make arrangements for patient to obtain a 2D echo for evaluation of ejection fraction with Dr. Jerome here in the office.  Otherwise, we will plan on replacing her generator in approximately 1 months time.   • GERD (gastroesophageal reflux disease)    • H/O hiatal hernia     Probable GERD syndrome   • Hiatal hernia    • Hypercholesterolemia    • Hypertension     Chronic hypertension, probably essential with minimal obstructive plaque on selective renal arteriography, February 2003.    • IHD (ischemic heart disease)    • Ischemic heart disease    • Knee pain, left     Recent severe left knee pain, swelling and immobility with serial normal left lower extremity venous duplex studies with apparent abnormal x-ray - data deficit ([facility], May-June 2005).    • Macular degeneration of both eyes    • SOB (shortness of breath)    • IZA (stress urinary incontinence, female)    • Tachyarrhythmia     Intermittent recurrent tachypalpitations with documented rapid symptomatic atrial fibrillation with  subsequent DDD/R pacemaker implant and AV node ablation (Guidant model #1298), February 2005.   • TIA (transient ischemic attack)     Remote TIA with apparent acceptable carotid duplex study and brain MRI scan, summer 2002.    • UTI (urinary tract infection)        Social History     Social History   • Marital status:      Spouse name: N/A   • Number of children: N/A   • Years of education: N/A     Occupational History   • Not on file.     Social History Main Topics   • Smoking status: Former Smoker     Quit date: 1966   • Smokeless tobacco: Never Used   • Alcohol use No   • Drug use: No   • Sexual activity: Defer     Other Topics Concern   • Not on file     Social History Narrative   • No narrative on file       Family History   Problem Relation Age of Onset   • Diabetes Mother    • Heart disease Mother    • Stroke Mother    • Hypertension Mother        Review of Systems   Constitution: Positive for weakness (same). Negative for decreased appetite.   HENT: Negative for congestion, hoarse voice, nosebleeds and sore throat.    Eyes: Negative for redness and visual disturbance.   Cardiovascular: Positive for leg swelling (mild). Negative for chest pain and near-syncope.   Respiratory: Positive for shortness of breath. Negative for sputum production.    Endocrine: Negative for polydipsia, polyphagia and polyuria.   Hematologic/Lymphatic: Negative for bleeding problem. Bruises/bleeds easily.   Skin: Negative for color change and itching.   Musculoskeletal: Positive for joint pain and muscle weakness. Negative for falls.   Gastrointestinal: Positive for constipation (managed with medication). Negative for abdominal pain, heartburn and melena.   Genitourinary: Negative for dysuria and hematuria.   Neurological: Negative for dizziness and headaches.   Psychiatric/Behavioral: Positive for memory loss. The patient is not nervous/anxious.         Objective     /60 (BP Location: Right arm)   Pulse 75   Ht 160 cm  "(62.99\")   Wt 68 kg (150 lb)   BMI 26.58 kg/m²     Physical Exam   Constitutional: She is oriented to person, place, and time. She appears well-developed. No distress.   HENT:   Head: Normocephalic.   Eyes: Conjunctivae are normal. Pupils are equal, round, and reactive to light.   Neck: Neck supple. Carotid bruit is not present.   Cardiovascular: Normal rate.    Murmur heard.  Pulses:       Radial pulses are 2+ on the right side, and 2+ on the left side.   Pulmonary/Chest: She has no rales.   Abdominal: Soft. Bowel sounds are normal. She exhibits no distension. There is no tenderness.   Musculoskeletal: She exhibits edema (mild in ankles).   Neurological: She is alert and oriented to person, place, and time.   Skin: Skin is warm and dry. No pallor.   Psychiatric: She has a normal mood and affect.   Vitals reviewed.         ECG 12 Lead  Date/Time: 7/3/2018 5:54 PM  Performed by: YESSY TURNER  Authorized by: YESSY TURNER   Comparison: compared with previous ECG from 4/19/2018  Similar to previous ECG  Comparison to previous ECG: Atrial fib with ventricular pacing  Rhythm: atrial fibrillation and paced  BPM: 75              Assessment/Plan      Shameka was seen today for follow-up, shortness of breath and pacemaker check.    Diagnoses and all orders for this visit:    Chronic atrial fibrillation (CMS/HCC)    Coronary artery disease involving native coronary artery of native heart without angina pectoris    SSS (sick sinus syndrome)    Ischemic heart disease    Pacemaker  -     Adult Transthoracic Echo Complete W/ Cont if Necessary Per Protocol; Future    Shortness of breath  -     Adult Transthoracic Echo Complete W/ Cont if Necessary Per Protocol; Future    Pericardial effusion  -     Adult Transthoracic Echo Complete W/ Cont if Necessary Per Protocol; Future    Localized edema  -     Adult Transthoracic Echo Complete W/ Cont if Necessary Per Protocol; Future    Other orders  -     ECG 12 Lead      Monitor BP " daily at nursing home. If systolic < 100 consistently consider decreased hydralazine to 25 mg daily.    EKG for management of pacemaker today shows atrial fib with ventricular pacing. CHADVASc score is 6 on Eliquis. It is noted she is taking renal dose.  Currently she denies signs of bleeding, therefore, continue the same. Her pacemaker interrogations are managed by Dr. Orozco.     Echo ordered to reassess overall LV function and for any pericardial effusion which was mild on CT scan in April. Patient admits to shortness of breath.     Labs managed by PCP/nursing home. I did not give her any lab orders today. For cholesterol management she reports tolerating Lipitor well and advised to continue the same at this time.     Patient's Body mass index is 26.58 kg/m². BMI is slightly above normal parameters. Recommendations include: nutrition counseling. Heart healthy diet encouraged.     Further recommendations based on echo report. A 6 month follow up scheduled.             Electronically signed by ALEYDA Winter,  July 3, 2018 5:58 PM

## 2018-07-10 ENCOUNTER — HOSPITAL ENCOUNTER (OUTPATIENT)
Dept: CARDIOLOGY | Facility: HOSPITAL | Age: 83
Discharge: HOME | End: 2018-07-10
Admitting: NURSE PRACTITIONER

## 2018-07-10 ENCOUNTER — OUTSIDE FACILITY SERVICE (OUTPATIENT)
Dept: CARDIOLOGY | Facility: CLINIC | Age: 83
End: 2018-07-10

## 2018-07-10 DIAGNOSIS — R06.02 SHORTNESS OF BREATH: ICD-10-CM

## 2018-07-10 DIAGNOSIS — R60.0 LOCALIZED EDEMA: ICD-10-CM

## 2018-07-10 DIAGNOSIS — Z95.0 PACEMAKER: ICD-10-CM

## 2018-07-10 DIAGNOSIS — I31.39 PERICARDIAL EFFUSION: ICD-10-CM

## 2018-07-10 LAB
MAXIMAL PREDICTED HEART RATE: 135 BPM
STRESS TARGET HR: 115 BPM

## 2018-07-10 PROCEDURE — 93306 TTE W/DOPPLER COMPLETE: CPT

## 2018-07-10 PROCEDURE — 93306 TTE W/DOPPLER COMPLETE: CPT | Performed by: INTERNAL MEDICINE

## 2018-07-18 ENCOUNTER — TELEPHONE (OUTPATIENT)
Dept: CARDIOLOGY | Facility: CLINIC | Age: 83
End: 2018-07-18

## 2018-08-16 NOTE — PROGRESS NOTES
Shameka Aguilera  5/22/1933  PCP: Bob Alford MD    SUBJECTIVE:   Shamkea Aguilera is a 85 y.o. female seen for a follow up visit regarding the following:     Chief Complaint: Follow up for atrial fibrillation    HPI:    Since last visit the patient's status has been stable. In April 2018, she was seen in the office due to recurrent nose bleeds. Due to stroke risk, she will continue reduced Eliquis dosing and no aspirin. No further nose bleeds    History:   This is an 85-year-old patient with a history of permanent atrial fibrillation with heart block.  She underwent pacemaker implantation.    Cardiac PMH: (Old records have been reviewed and summarized below)      Current Outpatient Prescriptions:   •  acetaminophen (TYLENOL) 500 MG tablet, Take 500 mg by mouth every 6 (six) hours as needed for mild pain (1-3)., Disp: , Rfl:   •  acetaminophen (TYLENOL) 650 MG suppository, Insert 650 mg into the rectum Every 6 (Six) Hours As Needed for Mild Pain ., Disp: , Rfl:   •  apixaban (ELIQUIS) 2.5 MG tablet tablet, Take 2.5 mg by mouth 2 (Two) Times a Day., Disp: , Rfl:   •  atorvastatin (LIPITOR) 40 MG tablet, Take 40 mg by mouth daily., Disp: , Rfl:   •  bisacodyl (DULCOLAX) 10 MG suppository, Insert 10 mg into the rectum Daily. As needed for constipation., Disp: , Rfl:   •  carvedilol (COREG) 6.25 MG tablet, Take 6.25 mg by mouth 2 (two) times a day with meals., Disp: , Rfl:   •  Coenzyme Q10 (CO Q10) 60 MG capsule, Take  by mouth daily., Disp: , Rfl:   •  cyanocobalamin 1000 MCG/ML injection, Inject 1,000 mcg into the shoulder, thigh, or buttocks every 28 (twenty-eight) days., Disp: , Rfl:   •  Dextromethorphan-Guaifenesin (ROBITUSSIN DM SUGAR FREE PO), Take  by mouth Every 4 (Four) Hours As Needed., Disp: , Rfl:   •  docusate sodium (COLACE) 250 MG capsule, Take 250 mg by mouth daily., Disp: , Rfl:   •  famotidine (PEPCID) 40 MG tablet, Take 40 mg by mouth daily., Disp: , Rfl:   •  ferrous sulfate 325 (65 FE)  MG tablet, Take 325 mg by mouth 2 (Two) Times a Day., Disp: , Rfl:   •  furosemide (LASIX) 40 MG tablet, Take 40 mg by mouth Daily., Disp: , Rfl:   •  hydrALAZINE (APRESOLINE) 50 MG tablet, Take 50 mg by mouth Daily., Disp: , Rfl:   •  Lactobacillus (ACIDOPHILUS/PECTIN PO), Take 1 tablet by mouth 2 (Two) Times a Day., Disp: , Rfl:   •  lactulose (CHRONULAC) 10 GM/15ML solution, Take 20 g by mouth Daily As Needed., Disp: , Rfl:   •  magnesium hydroxide (MILK OF MAGNESIA) 400 MG/5ML suspension, Take  by mouth daily as needed for constipation., Disp: , Rfl:   •  methocarbamol (ROBAXIN) 500 MG tablet, Take 500 mg by mouth 4 (Four) Times a Day As Needed for Muscle Spasms., Disp: , Rfl:   •  montelukast (SINGULAIR) 10 MG tablet, Take 10 mg by mouth every night., Disp: , Rfl:   •  ondansetron (ZOFRAN) 4 MG tablet, Take 4 mg by mouth Every 8 (Eight) Hours As Needed for Nausea or Vomiting., Disp: , Rfl:   •  polyethyl glycol-propyl glycol (SYSTANE) 0.4-0.3 % solution ophthalmic solution, 1 drop 3 (Three) Times a Day., Disp: , Rfl:   •  polyethylene glycol (MIRALAX) packet, Take 17 g by mouth Daily As Needed., Disp: , Rfl:   •  prednisoLONE acetate (PRED FORTE) 1 % ophthalmic suspension, 1 drop Daily., Disp: , Rfl:   •  promethazine (PHENERGAN) 12.5 MG tablet, 12.5 mg every 6 (six) hours as needed for nausea or vomiting., Disp: , Rfl:   •  sodium chloride (OCEAN) 0.65 % nasal spray, 1 spray into each nostril as needed for congestion., Disp: , Rfl:   •  spironolactone (ALDACTONE) 25 MG tablet, Take 25 mg by mouth Daily., Disp: , Rfl:     Past Medical History, Past Surgical History, Family history, Social History, and Medications were all reviewed with the patient today and updated as necessary.       Patient Active Problem List   Diagnosis   • Permanent atrial fibrillation (CMS/HCC)   • Coronary artery disease involving native coronary artery of native heart without angina pectoris   • Chronic fatigue   • Essential  hypertension   • Hypercholesteremia   • SSS (sick sinus syndrome) (CMS/HCC)   • Ischemic heart disease   • H/O hiatal hernia   • Macular degeneration of both eyes   • Exertional dyspnea   • Tachyarrhythmia   • Complete heart block (CMS/HCC)   • Chronic atrial fibrillation (CMS/HCC)   • Pacemaker   • Current use of long term anticoagulation     Allergies   Allergen Reactions   • Codeine      Nausea and vomiting   • Lotrel [Amlodipine Besy-Benazepril Hcl]      cough   • Mobic [Meloxicam] Other (See Comments)     Unable to recall reaction   • Morphine And Related Confusion   • Sular [Nisoldipine Er] Other (See Comments)     Unable to recall reaction   • Sulfa Antibiotics Nausea And Vomiting     Past Medical History:   Diagnosis Date   • Abnormal results of thyroid function studies     Remote   • Anemia    • Anxiety    • Asthma    • CAD (coronary artery disease)    • Cardiomegaly    • CHF (congestive heart failure) (CMS/[facility])    • Circumoral numbness     Intermittent transient facial paresthesias- TIA?.    • Complete heart block (CMS/HCC)     w/ normal functioning pacemaker   • COPD (chronic obstructive pulmonary disease) (CMS/[facility])    • Depression    • Diabetes mellitus (CMS/[facility])    • Dyslipidemia    • Exertional dyspnea     Chronic exertional dyspnea and fatigue with acceptable normal low probability lung perfusion/ventilation scan, February 2003.    • Fatigue     Generalized fatigue of unclear etiology, however, this maybe secondary to lung function and advanced age and not due to deficit in cardiac function.  However, we will make arrangements for patient to obtain a 2D echo for evaluation of ejection fraction with Dr. Jerome here in the office.  Otherwise, we will plan on replacing her generator in approximately 1 months time.   • GERD (gastroesophageal reflux disease)    • H/O hiatal hernia     Probable GERD syndrome   • Hiatal hernia    • Hypercholesterolemia    • Hypertension     Chronic hypertension, probably  essential with minimal obstructive plaque on selective renal arteriography, February 2003.    • IHD (ischemic heart disease)    • Ischemic heart disease    • Knee pain, left     Recent severe left knee pain, swelling and immobility with serial normal left lower extremity venous duplex studies with apparent abnormal x-ray - data deficit ([facility], May-June 2005).    • Macular degeneration of both eyes    • SOB (shortness of breath)    • IZA (stress urinary incontinence, female)    • Tachyarrhythmia     Intermittent recurrent tachypalpitations with documented rapid symptomatic atrial fibrillation with subsequent DDD/R pacemaker implant and AV node ablation (Guidant model #1298), February 2005.   • TIA (transient ischemic attack)     Remote TIA with apparent acceptable carotid duplex study and brain MRI scan, summer 2002.    • UTI (urinary tract infection)      Past Surgical History:   Procedure Laterality Date   • ANGIOPLASTY      Remote, atypical, disabling chest pain syndrome with catheterization studies demonstrating severe 2-vessel obstructive coronary artery disease with acceptable left ventricular function with LVEF 0.75 and subsequent angioplasty and stent deployment (LAD-LCX), April 2001.   • APPENDECTOMY     • CARDIAC CATHETERIZATION  04/15/2016    75% RCA- 4.0x8 & 2.5x16 Promus   • CARDIAC CATHETERIZATION  04/2001    Cath-PTCA % Stents in LAD & LCX.   • CARDIAC CATHETERIZATION  02/2003    Cath-Brachytherapy to LCX.   • CARDIAC CATHETERIZATION  02/2004    Cath-PTCA & Stent in LCX.   • CARDIAC CATHETERIZATION  03/12/2008    Cath-705 LCX-2.77t45ul Taxus. 75%RCA-3.5x16mm Taxus.   • CARDIOVASCULAR STRESS TEST  03/07/2013    L.Myoview-negative for ischemia   • CARDIOVASCULAR STRESS TEST  05/02/2008    A. Cardiolyte-([facility]) Negative.   • CHOLECYSTECTOMY      remote   • CONVERTED (HISTORICAL) HOLTER  08/05/2006    Holter-Paced rythm at 78BPM.   • ECHO - CONVERTED  01/17/2007     "Echo-EF65%. ApicalWMA.   • ECHO - CONVERTED  04/20/2009    Echo-EF>60%   • ECHO - CONVERTED  03/07/2013    Echo-EF 60-65%, RVSP 47mmHg   • ECHO - CONVERTED  10/31/2011    Echo-EF 60%, RVSP-46mmHg, Mod mR.   • ECHO - CONVERTED  07/10/2018    EF 60%. STEPH- 1.33 Cm2. Mod MR. RVSP- 49 mmHg.   • EYE SURGERY Left     Corneal Implant   • OTHER SURGICAL HISTORY      colon polypectomies x 4.   • PACEMAKER IMPLANTATION  02/14/2005    Pacemaker-Guidant by Dr. Monzon   • PACEMAKER REPLACEMENT  10/07/2009    Pacemaker replacement of dual chamber pacemaker. Dr. Monzon.   • TOTAL ABDOMINAL HYSTERECTOMY WITH SALPINGO OOPHORECTOMY       Family History   Problem Relation Age of Onset   • Diabetes Mother    • Heart disease Mother    • Stroke Mother    • Hypertension Mother      Social History   Substance Use Topics   • Smoking status: Former Smoker     Quit date: 1966   • Smokeless tobacco: Never Used   • Alcohol use No         PHYSICAL EXAM:    /70 (BP Location: Left arm, Patient Position: Sitting)   Pulse 74   Ht 162.6 cm (64\")   Wt 66.7 kg (147 lb)   BMI 25.23 kg/m²        Wt Readings from Last 5 Encounters:   08/17/18 66.7 kg (147 lb)   07/02/18 68 kg (150 lb)   01/02/18 67.6 kg (149 lb)   12/15/17 64.9 kg (143 lb)   04/21/17 64.4 kg (142 lb)       BP Readings from Last 5 Encounters:   08/17/18 122/70   07/02/18 100/60   04/19/18 110/64   01/02/18 110/60   12/15/17 100/64       General-Well Nourished, Well developed  Eyes - PERRLA  Neck- supple, No mass  CV- regular rate and rhythm, no MRG, No edema  Lung- clear bilaterally  Abd- soft, +BS  Musc/skel - Norm strength and range of motion  Skin- warm and dry  Neuro - Alert & Oriented x 3, appropriate mood.        Medical problems and test results were reviewed with the patient today.     No results found for this or any previous visit (from the past 672 hour(s)).      EKG: (EKG has been independently visualized by me and summarized below)    Procedures     ASSESSMENT " and PLAN  1. Perm Atrial Fibrillation with CHB s/p PPM. 100% RV paced. On Eliquis 2.5 mg BID.   2. HTN stable today. Continue on current Rx  3. Pacemaker - Stable function - Adjusted rate response - Norm function otherwise        Return in about 6 months (around 2/17/2019) for 6 months with [facility].        Kyle Linda M.D., FCHAR.C, F.H.R.S.  Cardiology/Electrophysiology  8/17/2018  1:31 PM

## 2018-08-17 ENCOUNTER — OFFICE VISIT (OUTPATIENT)
Dept: CARDIOLOGY | Facility: CLINIC | Age: 83
End: 2018-08-17

## 2018-08-17 VITALS
SYSTOLIC BLOOD PRESSURE: 122 MMHG | BODY MASS INDEX: 25.1 KG/M2 | DIASTOLIC BLOOD PRESSURE: 70 MMHG | HEART RATE: 74 BPM | WEIGHT: 147 LBS | HEIGHT: 64 IN

## 2018-08-17 DIAGNOSIS — I10 ESSENTIAL HYPERTENSION: ICD-10-CM

## 2018-08-17 DIAGNOSIS — Z95.0 PACEMAKER: ICD-10-CM

## 2018-08-17 DIAGNOSIS — I48.21 PERMANENT ATRIAL FIBRILLATION (HCC): Primary | ICD-10-CM

## 2018-08-17 PROCEDURE — 99213 OFFICE O/P EST LOW 20 MIN: CPT | Performed by: INTERNAL MEDICINE

## 2018-08-17 PROCEDURE — 93280 PM DEVICE PROGR EVAL DUAL: CPT | Performed by: INTERNAL MEDICINE

## 2019-01-01 ENCOUNTER — OFFICE VISIT (OUTPATIENT)
Dept: CARDIOLOGY | Facility: CLINIC | Age: 84
End: 2019-01-01

## 2019-01-01 VITALS
BODY MASS INDEX: 24.16 KG/M2 | DIASTOLIC BLOOD PRESSURE: 72 MMHG | HEART RATE: 74 BPM | OXYGEN SATURATION: 99 % | SYSTOLIC BLOOD PRESSURE: 128 MMHG | HEIGHT: 65 IN | WEIGHT: 145 LBS

## 2019-01-01 DIAGNOSIS — I48.20 CHRONIC ATRIAL FIBRILLATION (HCC): Primary | ICD-10-CM

## 2019-01-01 DIAGNOSIS — I44.2 COMPLETE HEART BLOCK (HCC): ICD-10-CM

## 2019-01-01 DIAGNOSIS — I48.21 PERMANENT ATRIAL FIBRILLATION (HCC): ICD-10-CM

## 2019-01-01 PROCEDURE — 93279 PRGRMG DEV EVAL PM/LDLS PM: CPT | Performed by: INTERNAL MEDICINE

## 2019-01-01 PROCEDURE — 99213 OFFICE O/P EST LOW 20 MIN: CPT | Performed by: INTERNAL MEDICINE

## 2019-01-08 ENCOUNTER — OFFICE VISIT (OUTPATIENT)
Dept: CARDIOLOGY | Facility: CLINIC | Age: 84
End: 2019-01-08

## 2019-01-08 VITALS
SYSTOLIC BLOOD PRESSURE: 128 MMHG | HEART RATE: 75 BPM | HEIGHT: 64 IN | WEIGHT: 146 LBS | DIASTOLIC BLOOD PRESSURE: 70 MMHG | BODY MASS INDEX: 24.92 KG/M2

## 2019-01-08 DIAGNOSIS — I25.10 CORONARY ARTERY DISEASE INVOLVING NATIVE CORONARY ARTERY OF NATIVE HEART WITHOUT ANGINA PECTORIS: ICD-10-CM

## 2019-01-08 DIAGNOSIS — E78.00 HYPERCHOLESTEREMIA: ICD-10-CM

## 2019-01-08 DIAGNOSIS — I25.9 ISCHEMIC HEART DISEASE: ICD-10-CM

## 2019-01-08 DIAGNOSIS — Z79.01 CURRENT USE OF LONG TERM ANTICOAGULATION: ICD-10-CM

## 2019-01-08 DIAGNOSIS — I49.5 SSS (SICK SINUS SYNDROME) (HCC): ICD-10-CM

## 2019-01-08 DIAGNOSIS — I48.20 CHRONIC ATRIAL FIBRILLATION (HCC): Primary | ICD-10-CM

## 2019-01-08 DIAGNOSIS — I10 ESSENTIAL HYPERTENSION: ICD-10-CM

## 2019-01-08 PROCEDURE — 99214 OFFICE O/P EST MOD 30 MIN: CPT | Performed by: NURSE PRACTITIONER

## 2019-01-08 PROCEDURE — 93000 ELECTROCARDIOGRAM COMPLETE: CPT | Performed by: NURSE PRACTITIONER

## 2019-01-08 RX ORDER — ASPIRIN 81 MG/1
81 TABLET ORAL DAILY
COMMUNITY

## 2019-01-08 NOTE — PROGRESS NOTES
"Chief Complaint   Patient presents with   • Follow-up     Cardiac management. Patient is in [facility]. She reports having some \"nose bleeds\" since last visit and went to ER.   • Pacemaker Check     Last [employer] PPM check 8/17/18.       Subjective       Shameka Aguilera is an 85 y.o. female with history of HTN, hypercholesterolemia and IHD with stenting in 2001 followed by more stenting in 2004, 2008, and most recently in 2016 when she developed more SOB, pulmonary congestions, and elevated enzymes found to have significant lesion of the RCA and two stents placed. Her LV function remained stable with moderate MR. She also has SSS with pacemaker followed by Dr. Orozco now by Dr. Linda. She is due to have interrogation in March 2019. She was managed with Plavix and aspirin until Eliquis initiated by EP secondary to atrial fib. Plavix was stopped. August 2018 interrogation showed ventricular pacing due to chronic AF, normal function, 6 years battery life remaining. Most recent echo 7/10/18 showed EF 60%, mod AI with STEPH 1.3 cm2, mod MR, RVSP 49 mmHg. She came in today for follow up with her caregiver from [facility]. She denies chest pain, shortness of breath, palpitations. She was having some epistaxis which has now resolved. Overall, she feels like she is doing well. Labs are refills are managed by Dr. Alford through the nursing home.     HPI         Cardiac History:    Past Surgical History:   Procedure Laterality Date   • ANGIOPLASTY      Remote, atypical, disabling chest pain syndrome with catheterization studies demonstrating severe 2-vessel obstructive coronary artery disease with acceptable left ventricular function with LVEF 0.75 and subsequent angioplasty and stent deployment (LAD-LCX), April 2001.   • CARDIAC CATHETERIZATION  04/15/2016    75% RCA- 4.0x8 & 2.5x16 Promus   • CARDIAC CATHETERIZATION  04/2001    Cath-PTCA % Stents in LAD & LCX.   • CARDIAC " CATHETERIZATION  02/2003    Cath-Brachytherapy to LCX.   • CARDIAC CATHETERIZATION  02/2004    Cath-PTCA & Stent in LCX.   • CARDIAC CATHETERIZATION  03/12/2008    Cath-70% LCX-2.49a87mc Taxus. 75%RCA-3.5x16mm Taxus.   • CARDIOVASCULAR STRESS TEST  03/07/2013    L.Myoview-negative for ischemia   • CARDIOVASCULAR STRESS TEST  05/02/2008    A. Cardiolyte-([facility]) Negative.   • CONVERTED (HISTORICAL) HOLTER  08/05/2006    Holter-Paced rythm at 78BPM.   • ECHO - CONVERTED  01/17/2007    Echo-EF65%. ApicalWMA.   • ECHO - CONVERTED  04/20/2009    Echo-EF>60%   • ECHO - CONVERTED  03/07/2013    Echo-EF 60-65%, RVSP 47mmHg   • ECHO - CONVERTED  10/31/2011    Echo-EF 60%, RVSP-46mmHg, Mod mR.   • ECHO - CONVERTED  07/10/2018    EF 60%. STEPH- 1.33 Cm2. Mod MR. RVSP- 49 mmHg.   • PACEMAKER IMPLANTATION  02/14/2005    Pacemaker-Guidant by Dr. Monzon   • PACEMAKER REPLACEMENT  10/07/2009    Pacemaker replacement of dual chamber pacemaker. Dr. Monzon.       Current Outpatient Medications   Medication Sig Dispense Refill   • acetaminophen (TYLENOL) 500 MG tablet Take 500 mg by mouth every 6 (six) hours as needed for mild pain (1-3).     • acetaminophen (TYLENOL) 650 MG suppository Insert 650 mg into the rectum Every 6 (Six) Hours As Needed for Mild Pain .     • apixaban (ELIQUIS) 2.5 MG tablet tablet Take 2.5 mg by mouth 2 (Two) Times a Day.     • aspirin 81 MG EC tablet Take 81 mg by mouth Daily.     • atorvastatin (LIPITOR) 40 MG tablet Take 40 mg by mouth daily.     • bisacodyl (DULCOLAX) 10 MG suppository Insert 10 mg into the rectum Daily. As needed for constipation.     • carvedilol (COREG) 6.25 MG tablet Take 6.25 mg by mouth 2 (two) times a day with meals.     • Coenzyme Q10 (CO Q10) 60 MG capsule Take  by mouth daily.     • cyanocobalamin 1000 MCG/ML injection Inject 1,000 mcg into the shoulder, thigh, or buttocks every 28 (twenty-eight) days.     • Dextromethorphan-Guaifenesin (ROBITUSSIN DM SUGAR FREE PO) Take  by  mouth Every 6 (Six) Hours As Needed.     • docusate sodium (COLACE) 250 MG capsule Take 250 mg by mouth daily.     • famotidine (PEPCID) 40 MG tablet Take 40 mg by mouth daily.     • ferrous sulfate 325 (65 FE) MG tablet Take 325 mg by mouth 2 (Two) Times a Day.     • furosemide (LASIX) 40 MG tablet Take 40 mg by mouth Daily.     • hydrALAZINE (APRESOLINE) 50 MG tablet Take 50 mg by mouth Daily.     • Lactobacillus (ACIDOPHILUS/PECTIN PO) Take 1 tablet by mouth 2 (Two) Times a Day.     • lactulose (CHRONULAC) 10 GM/15ML solution Take 20 g by mouth Daily As Needed.     • magnesium hydroxide (MILK OF MAGNESIA) 400 MG/5ML suspension Take  by mouth daily as needed for constipation.     • methocarbamol (ROBAXIN) 500 MG tablet Take 500 mg by mouth 4 (Four) Times a Day As Needed for Muscle Spasms.     • montelukast (SINGULAIR) 10 MG tablet Take 10 mg by mouth every night.     • ondansetron (ZOFRAN) 4 MG tablet Take 4 mg by mouth Every 8 (Eight) Hours As Needed for Nausea or Vomiting.     • polyethyl glycol-propyl glycol (SYSTANE) 0.4-0.3 % solution ophthalmic solution 1 drop 3 (Three) Times a Day.     • polyethylene glycol (MIRALAX) packet Take 17 g by mouth Daily As Needed.     • prednisoLONE acetate (PRED FORTE) 1 % ophthalmic suspension 1 drop Daily.     • promethazine (PHENERGAN) 12.5 MG tablet 12.5 mg every 6 (six) hours as needed for nausea or vomiting.     • sodium chloride (OCEAN) 0.65 % nasal spray 1 spray into each nostril as needed for congestion.     • spironolactone (ALDACTONE) 25 MG tablet Take 25 mg by mouth Daily.       No current facility-administered medications for this visit.        Codeine; Lotrel [amlodipine besy-benazepril hcl]; Mobic [meloxicam]; Morphine and related; Sular [nisoldipine er]; and Sulfa antibiotics    Past Medical History:   Diagnosis Date   • Abnormal results of thyroid function studies     Remote   • Anemia    • Anxiety    • Asthma    • CAD (coronary artery disease)    •  Cardiomegaly    • CHF (congestive heart failure) (CMS/HCC)    • Circumoral numbness     Intermittent transient facial paresthesias- TIA?.    • Complete heart block (CMS/HCC)     w/ normal functioning pacemaker   • COPD (chronic obstructive pulmonary disease) (CMS/HCC)    • Depression    • Diabetes mellitus (CMS/HCC)    • Dyslipidemia    • Exertional dyspnea     Chronic exertional dyspnea and fatigue with acceptable normal low probability lung perfusion/ventilation scan, February 2003.    • Fatigue     Generalized fatigue of unclear etiology, however, this maybe secondary to lung function and advanced age and not due to deficit in cardiac function.  However, we will make arrangements for patient to obtain a 2D echo for evaluation of ejection fraction with Dr. Jerome here in the office.  Otherwise, we will plan on replacing her generator in approximately 1 months time.   • GERD (gastroesophageal reflux disease)    • H/O hiatal hernia     Probable GERD syndrome   • Hiatal hernia    • Hypercholesterolemia    • Hypertension     Chronic hypertension, probably essential with minimal obstructive plaque on selective renal arteriography, February 2003.    • IHD (ischemic heart disease)    • Ischemic heart disease    • Knee pain, left     Recent severe left knee pain, swelling and immobility with serial normal left lower extremity venous duplex studies with apparent abnormal x-ray - data deficit ([facility], May-June 2005).    • Macular degeneration of both eyes    • SOB (shortness of breath)    • IZA (stress urinary incontinence, female)    • Tachyarrhythmia     Intermittent recurrent tachypalpitations with documented rapid symptomatic atrial fibrillation with subsequent DDD/R pacemaker implant and AV node ablation (Guidant model #1298), February 2005.   • TIA (transient ischemic attack)     Remote TIA with apparent acceptable carotid duplex study and brain MRI scan, summer 2002.    • UTI  "(urinary tract infection)        Social History     Socioeconomic History   • Marital status:      Spouse name: Not on file   • Number of children: Not on file   • Years of education: Not on file   • Highest education level: Not on file   Social Needs   • Financial resource strain: Not on file   • Food insecurity - worry: Not on file   • Food insecurity - inability: Not on file   • Transportation needs - medical: Not on file   • Transportation needs - non-medical: Not on file   Occupational History   • Not on file   Tobacco Use   • Smoking status: Former Smoker     Last attempt to quit: 1966     Years since quittin.0   • Smokeless tobacco: Never Used   Substance and Sexual Activity   • Alcohol use: No   • Drug use: No   • Sexual activity: Defer   Other Topics Concern   • Not on file   Social History Narrative   • Not on file       Family History   Problem Relation Age of Onset   • Diabetes Mother    • Heart disease Mother    • Stroke Mother    • Hypertension Mother        Review of Systems   Constitution: Positive for weakness and weight loss (decreased by 1 lb, stable ). Negative for decreased appetite.   HENT: Negative.  Negative for nosebleeds (not recently ).    Eyes: Negative.    Cardiovascular: Positive for leg swelling (mild ). Negative for chest pain, dyspnea on exertion, palpitations and syncope.   Respiratory: Negative for cough and shortness of breath.    Endocrine: Negative.    Hematologic/Lymphatic: Negative for bleeding problem.   Skin: Negative.    Musculoskeletal: Negative for falls and myalgias.   Gastrointestinal: Negative for abdominal pain and melena.   Genitourinary: Negative for dysuria and hematuria.   Neurological: Negative for dizziness.   Psychiatric/Behavioral: Negative for altered mental status and depression.   Allergic/Immunologic: Negative.       Diabetes- No  Thyroid-normal    Objective     /70 (BP Location: Right arm)   Pulse 75   Ht 162.6 cm (64.02\")   Wt 66.2 " kg (146 lb)   BMI 25.05 kg/m²     Physical Exam   Constitutional: She is oriented to person, place, and time. She appears well-developed and well-nourished. No distress.   HENT:   Head: Normocephalic.   Eyes: Pupils are equal, round, and reactive to light.   Neck: Normal range of motion.   Cardiovascular: Normal rate, regular rhythm and intact distal pulses.   Murmur heard.   Systolic murmur is present with a grade of 3/6 at the upper right sternal border and apex.  Pulmonary/Chest: Effort normal and breath sounds normal. No respiratory distress.   Abdominal: Soft. Bowel sounds are normal.   Musculoskeletal: Normal range of motion. She exhibits edema.   Neurological: She is alert and oriented to person, place, and time.   Skin: Skin is warm and dry. She is not diaphoretic.   Psychiatric: She has a normal mood and affect.   Nursing note and vitals reviewed.       ECG 12 Lead  Date/Time: 1/11/2019 10:34 AM  Performed by: Juju Dalal APRN  Authorized by: Juju Dalal APRN   Rhythm: atrial fibrillation and paced  Rate: normal  BPM: 75  Clinical impression: abnormal ECG  Comments: Chronic atrial fib, ventricular pacing  QTc 509 ms,  ms                 Assessment/Plan    Heart rate and blood pressure are stable. EKG done today for atrial fib and pacemaker management showed ventricular pacing and underlying AF. She is tolerating Eliquis 2.5 mg BID without bleeding, so continue the same. Pacemaker interrogation will be done by Dr. Linda in March. Continue Coreg. Continue Lipitor for hyperlipidemia. Labs are followed by Dr. Alford. We reviewed her recent echo showing stable LV function, moderate aortic stenosis and elevated PA pressure. Continue Lasix and aldactone. She is taking hydralazine only once daily, but appears to have well controlled BP. No changes made today. Her appetite is good and weight is stable. She appears stable from a cardiac standpoint. We will see her back in six months or sooner if needed.     Shameka was seen today for follow-up and pacemaker check.    Diagnoses and all orders for this visit:    Chronic atrial fibrillation (CMS/HCC)  -     ECG 12 Lead    Coronary artery disease involving native coronary artery of native heart without angina pectoris    Essential hypertension    Hypercholesteremia    Ischemic heart disease    SSS (sick sinus syndrome) (CMS/HCC)  -     ECG 12 Lead    Current use of long term anticoagulation        Patient's Body mass index is 25.05 kg/m². BMI is above normal parameters. Recommendations include: nutrition counseling.                 Electronically signed by ALEYDA Henry,  January 11, 2019 10:44 AM

## 2019-03-15 ENCOUNTER — OFFICE VISIT (OUTPATIENT)
Dept: CARDIOLOGY | Facility: CLINIC | Age: 84
End: 2019-03-15

## 2019-03-15 VITALS — OXYGEN SATURATION: 95 % | DIASTOLIC BLOOD PRESSURE: 60 MMHG | HEART RATE: 75 BPM | SYSTOLIC BLOOD PRESSURE: 110 MMHG

## 2019-03-15 DIAGNOSIS — I44.2 COMPLETE HEART BLOCK (HCC): ICD-10-CM

## 2019-03-15 DIAGNOSIS — I48.21 PERMANENT ATRIAL FIBRILLATION (HCC): Primary | ICD-10-CM

## 2019-03-15 PROCEDURE — 93279 PRGRMG DEV EVAL PM/LDLS PM: CPT | Performed by: INTERNAL MEDICINE

## 2019-03-15 PROCEDURE — 99213 OFFICE O/P EST LOW 20 MIN: CPT | Performed by: INTERNAL MEDICINE

## 2019-03-15 NOTE — PROGRESS NOTES
Shameka Aguilera  5/22/1933  PCP: Bob Alford MD    SUBJECTIVE:   Shameka Aguilera is a 85 y.o. female seen for a follow up visit regarding the following:     Chief Complaint: Follow up for atrial fibrillation    HPI:    Since last visit the patient's status has been stable. In April 2018, she was seen in the office due to recurrent nose bleeds. Due to stroke risk, she will continue reduced Eliquis dosing and no aspirin. No further nose bleeds. Her CVA continue.     History:   This is an 85-year-old patient with a history of permanent atrial fibrillation with heart block.  She underwent pacemaker implantation.    Cardiac PMH: (Old records have been reviewed and summarized below)      Current Outpatient Medications:   •  acetaminophen (TYLENOL) 500 MG tablet, Take 500 mg by mouth every 6 (six) hours as needed for mild pain (1-3)., Disp: , Rfl:   •  acetaminophen (TYLENOL) 650 MG suppository, Insert 650 mg into the rectum Every 6 (Six) Hours As Needed for Mild Pain ., Disp: , Rfl:   •  apixaban (ELIQUIS) 2.5 MG tablet tablet, Take 2.5 mg by mouth 2 (Two) Times a Day., Disp: , Rfl:   •  aspirin 81 MG EC tablet, Take 81 mg by mouth Daily., Disp: , Rfl:   •  atorvastatin (LIPITOR) 40 MG tablet, Take 40 mg by mouth daily., Disp: , Rfl:   •  bisacodyl (DULCOLAX) 10 MG suppository, Insert 10 mg into the rectum Daily. As needed for constipation., Disp: , Rfl:   •  carvedilol (COREG) 6.25 MG tablet, Take 6.25 mg by mouth 2 (two) times a day with meals., Disp: , Rfl:   •  Coenzyme Q10 (CO Q10) 60 MG capsule, Take  by mouth daily., Disp: , Rfl:   •  cyanocobalamin 1000 MCG/ML injection, Inject 1,000 mcg into the shoulder, thigh, or buttocks every 28 (twenty-eight) days., Disp: , Rfl:   •  Dextromethorphan-Guaifenesin (ROBITUSSIN DM SUGAR FREE PO), Take  by mouth Every 6 (Six) Hours As Needed., Disp: , Rfl:   •  docusate sodium (COLACE) 250 MG capsule, Take 250 mg by mouth daily., Disp: , Rfl:   •  famotidine (PEPCID)  40 MG tablet, Take 40 mg by mouth daily., Disp: , Rfl:   •  ferrous sulfate 325 (65 FE) MG tablet, Take 325 mg by mouth 2 (Two) Times a Day., Disp: , Rfl:   •  furosemide (LASIX) 40 MG tablet, Take 40 mg by mouth Daily., Disp: , Rfl:   •  hydrALAZINE (APRESOLINE) 50 MG tablet, Take 50 mg by mouth Daily., Disp: , Rfl:   •  Lactobacillus (ACIDOPHILUS/PECTIN PO), Take 1 tablet by mouth 2 (Two) Times a Day., Disp: , Rfl:   •  lactulose (CHRONULAC) 10 GM/15ML solution, Take 20 g by mouth Daily As Needed., Disp: , Rfl:   •  magnesium hydroxide (MILK OF MAGNESIA) 400 MG/5ML suspension, Take  by mouth daily as needed for constipation., Disp: , Rfl:   •  methocarbamol (ROBAXIN) 500 MG tablet, Take 500 mg by mouth 4 (Four) Times a Day As Needed for Muscle Spasms., Disp: , Rfl:   •  montelukast (SINGULAIR) 10 MG tablet, Take 10 mg by mouth every night., Disp: , Rfl:   •  ondansetron (ZOFRAN) 4 MG tablet, Take 4 mg by mouth Every 8 (Eight) Hours As Needed for Nausea or Vomiting., Disp: , Rfl:   •  polyethyl glycol-propyl glycol (SYSTANE) 0.4-0.3 % solution ophthalmic solution, 1 drop 3 (Three) Times a Day., Disp: , Rfl:   •  polyethylene glycol (MIRALAX) packet, Take 17 g by mouth Daily As Needed., Disp: , Rfl:   •  prednisoLONE acetate (PRED FORTE) 1 % ophthalmic suspension, 1 drop Daily., Disp: , Rfl:   •  promethazine (PHENERGAN) 12.5 MG tablet, 12.5 mg every 6 (six) hours as needed for nausea or vomiting., Disp: , Rfl:   •  sodium chloride (OCEAN) 0.65 % nasal spray, 1 spray into each nostril as needed for congestion., Disp: , Rfl:   •  spironolactone (ALDACTONE) 25 MG tablet, Take 25 mg by mouth Daily., Disp: , Rfl:     Past Medical History, Past Surgical History, Family history, Social History, and Medications were all reviewed with the patient today and updated as necessary.       Patient Active Problem List   Diagnosis   • Permanent atrial fibrillation (CMS/HCC)   • Coronary artery disease involving native coronary  artery of native heart without angina pectoris   • Chronic fatigue   • Essential hypertension   • Hypercholesteremia   • SSS (sick sinus syndrome) (CMS/[facility])   • Ischemic heart disease   • H/O hiatal hernia   • Macular degeneration of both eyes   • Exertional dyspnea   • Tachyarrhythmia   • Complete heart block (CMS/HCC)   • Chronic atrial fibrillation (CMS/HCC)   • Pacemaker   • Current use of long term anticoagulation     Allergies   Allergen Reactions   • Codeine      Nausea and vomiting   • Lotrel [Amlodipine Besy-Benazepril Hcl]      cough   • Mobic [Meloxicam] Other (See Comments)     Unable to recall reaction   • Morphine And Related Confusion   • Sular [Nisoldipine Er] Other (See Comments)     Unable to recall reaction   • Sulfa Antibiotics Nausea And Vomiting     Past Medical History:   Diagnosis Date   • Abnormal results of thyroid function studies     Remote   • Anemia    • Anxiety    • Asthma    • CAD (coronary artery disease)    • Cardiomegaly    • CHF (congestive heart failure) (CMS/[facility])    • Circumoral numbness     Intermittent transient facial paresthesias- TIA?.    • Complete heart block (CMS/HCC)     w/ normal functioning pacemaker   • COPD (chronic obstructive pulmonary disease) (CMS/[facility])    • Depression    • Diabetes mellitus (CMS/[facility])    • Dyslipidemia    • Exertional dyspnea     Chronic exertional dyspnea and fatigue with acceptable normal low probability lung perfusion/ventilation scan, February 2003.    • Fatigue     Generalized fatigue of unclear etiology, however, this maybe secondary to lung function and advanced age and not due to deficit in cardiac function.  However, we will make arrangements for patient to obtain a 2D echo for evaluation of ejection fraction with Dr. Jerome here in the office.  Otherwise, we will plan on replacing her generator in approximately 1 months time.   • GERD (gastroesophageal reflux disease)    • H/O hiatal hernia     Probable GERD syndrome   • Hiatal hernia     • Hypercholesterolemia    • Hypertension     Chronic hypertension, probably essential with minimal obstructive plaque on selective renal arteriography, February 2003.    • IHD (ischemic heart disease)    • Ischemic heart disease    • Knee pain, left     Recent severe left knee pain, swelling and immobility with serial normal left lower extremity venous duplex studies with apparent abnormal x-ray - data deficit ([facility], May-June 2005).    • Macular degeneration of both eyes    • SOB (shortness of breath)    • IZA (stress urinary incontinence, female)    • Tachyarrhythmia     Intermittent recurrent tachypalpitations with documented rapid symptomatic atrial fibrillation with subsequent DDD/R pacemaker implant and AV node ablation ([employer] model #1298), February 2005.   • TIA (transient ischemic attack)     Remote TIA with apparent acceptable carotid duplex study and brain MRI scan, summer 2002.    • UTI (urinary tract infection)      Past Surgical History:   Procedure Laterality Date   • ANGIOPLASTY      Remote, atypical, disabling chest pain syndrome with catheterization studies demonstrating severe 2-vessel obstructive coronary artery disease with acceptable left ventricular function with LVEF 0.75 and subsequent angioplasty and stent deployment (LAD-LCX), April 2001.   • CARDIAC CATHETERIZATION  04/15/2016    75% RCA- 4.0x8 & 2.5x16 Promus   • CARDIAC CATHETERIZATION  04/2001    Cath-PTCA % Stents in LAD & LCX.   • CARDIAC CATHETERIZATION  02/2003    Cath-Brachytherapy to LCX.   • CARDIAC CATHETERIZATION  02/2004    Cath-PTCA & Stent in LCX.   • CARDIAC CATHETERIZATION  03/12/2008    Cath-70% LCX-2.64a22kx Taxus. 75%RCA-3.5x16mm Taxus.   • CARDIOVASCULAR STRESS TEST  03/07/2013    L.Myoview-negative for ischemia   • CARDIOVASCULAR STRESS TEST  05/02/2008    A. Cardiolyte-([facility]) Negative.   • CONVERTED (HISTORICAL) HOLTER  08/05/2006    Holter-Paced rythm at 78BPM.   • ECHO -  CONVERTED  2007    Echo-EF65%. ApicalWMA.   • ECHO - CONVERTED  2009    Echo-EF>60%   • ECHO - CONVERTED  2013    Echo-EF 60-65%, RVSP 47mmHg   • ECHO - CONVERTED  10/31/2011    Echo-EF 60%, RVSP-46mmHg, Mod mR.   • ECHO - CONVERTED  07/10/2018    EF 60%. STEPH- 1.33 Cm2. Mod MR. RVSP- 49 mmHg.   • PACEMAKER IMPLANTATION  2005    Pacemaker-Guidant by Dr. Monzon   • PACEMAKER REPLACEMENT  10/07/2009    Pacemaker replacement of dual chamber pacemaker. Dr. Monzon.     Family History   Problem Relation Age of Onset   • Diabetes Mother    • Heart disease Mother    • Stroke Mother    • Hypertension Mother      Social History     Tobacco Use   • Smoking status: Former Smoker     Last attempt to quit: 1966     Years since quittin.2   • Smokeless tobacco: Never Used   Substance Use Topics   • Alcohol use: No         PHYSICAL EXAM:    /60 (BP Location: Left arm, Patient Position: Sitting)   Pulse 75   SpO2 95%        Wt Readings from Last 5 Encounters:   19 66.2 kg (146 lb)   18 66.7 kg (147 lb)   18 68 kg (150 lb)   18 67.6 kg (149 lb)   12/15/17 64.9 kg (143 lb)       BP Readings from Last 5 Encounters:   03/15/19 110/60   19 128/70   18 122/70   18 100/60   18 110/64       General-Well Nourished, Well developed  Eyes - PERRLA  Neck- supple, No mass  CV- regular rate and rhythm, no MRG, No edema  Lung- clear bilaterally  Abd- soft, +BS  Musc/skel - Norm strength and range of motion  Skin- warm and dry  Neuro - Alert & Oriented x 3, appropriate mood.        Medical problems and test results were reviewed with the patient today.     No results found for this or any previous visit (from the past 672 hour(s)).      EKG: (EKG has been independently visualized by me and summarized below)    Procedures     ASSESSMENT and PLAN  1. Perm Atrial Fibrillation with CHB s/p PPM. 100% RV paced. On Eliquis 2.5 mg BID.   2. HTN stable today. Continue on  current Rx  3. Pacemaker - Stable function - Adjusted rate response - Norm function otherwise  4. CVA - On Eliquis 2.5mg        Return in about 6 months (around 9/15/2019) for 6 months with [employer].        Kyle Linda M.D., F.ANUJ.C.C, F.H.R.S.  Cardiology/Electrophysiology  3/15/2019  1:48 PM

## 2019-07-09 ENCOUNTER — OFFICE VISIT (OUTPATIENT)
Dept: CARDIOLOGY | Facility: CLINIC | Age: 84
End: 2019-07-09

## 2019-07-09 VITALS
SYSTOLIC BLOOD PRESSURE: 126 MMHG | DIASTOLIC BLOOD PRESSURE: 74 MMHG | WEIGHT: 137 LBS | HEART RATE: 75 BPM | BODY MASS INDEX: 23.39 KG/M2 | HEIGHT: 64 IN

## 2019-07-09 DIAGNOSIS — E78.00 HYPERCHOLESTEREMIA: ICD-10-CM

## 2019-07-09 DIAGNOSIS — Z79.01 CURRENT USE OF LONG TERM ANTICOAGULATION: ICD-10-CM

## 2019-07-09 DIAGNOSIS — I49.5 SSS (SICK SINUS SYNDROME) (HCC): ICD-10-CM

## 2019-07-09 DIAGNOSIS — I10 ESSENTIAL HYPERTENSION: ICD-10-CM

## 2019-07-09 DIAGNOSIS — I35.1 AORTIC VALVE INSUFFICIENCY, ETIOLOGY OF CARDIAC VALVE DISEASE UNSPECIFIED: ICD-10-CM

## 2019-07-09 DIAGNOSIS — I48.20 CHRONIC ATRIAL FIBRILLATION (HCC): ICD-10-CM

## 2019-07-09 DIAGNOSIS — I25.10 CORONARY ARTERY DISEASE INVOLVING NATIVE CORONARY ARTERY OF NATIVE HEART WITHOUT ANGINA PECTORIS: Primary | ICD-10-CM

## 2019-07-09 PROCEDURE — 93000 ELECTROCARDIOGRAM COMPLETE: CPT | Performed by: NURSE PRACTITIONER

## 2019-07-09 PROCEDURE — 99213 OFFICE O/P EST LOW 20 MIN: CPT | Performed by: NURSE PRACTITIONER

## 2019-07-09 RX ORDER — MEMANTINE HYDROCHLORIDE 10 MG/1
10 TABLET ORAL 2 TIMES DAILY
COMMUNITY

## 2019-07-09 RX ORDER — LEVETIRACETAM 500 MG/1
500 TABLET ORAL 2 TIMES DAILY
COMMUNITY

## 2019-07-09 NOTE — PROGRESS NOTES
Chief Complaint   Patient presents with   • Follow-up     Cardiac management . Takes Aspirin 81 mg daily . Patient said she had an episode last week  of weakness in hands and was unable to  and had trouble  to focus cognitively . Is receiving OT and PT at Nursing home   • Shortness of Breath     With exertion   • Med Refill     No refills needed, refills per Nursing Home . Had medication list with her today       Subjective       Shameka Aguilera is a 86 y.o. female  with history of HTN, hypercholesterolemia and IHD with stenting in 2001 followed by more stenting in 2004, 2008, and most recently in 2016 when she developed more SOB, pulmonary congestions, and elevated enzymes found to have significant lesion of the RCA and two stents placed. Her LV function remained stable with moderate MR. She also has SSS with pacemaker followed by Dr. Orozco now by Dr. Linda. She was managed with Plavix and aspirin until Eliquis initiated by EP secondary to atrial fib. Plavix was stopped. Most recent echo 7/10/18 showed EF 60%, mod AI with STEPH 1.3 cm2, mod MR, RVSP 49 mmHg. Her pacemaker interrogation in March 2019 noted adjusted rate response, normal function otherwise.     Today she comes to the office for follow-up visit.  She denies chest pain or significant shortness of breath.  She does admit to fatigue and memory issues.  Last week she had episode of numbness in her hands and trouble focusing.  This occurred while she had been lying in bed more on her right side. After moving around for a while and getting up the symptoms subsided.  She denies any recurrence since that time.  Currently she is undergoing physical therapy and Occupational Therapy which she feels is helping overall.    HPI     Cardiac History:    Past Surgical History:   Procedure Laterality Date   • ANGIOPLASTY      Remote, atypical, disabling chest pain syndrome with catheterization studies demonstrating severe 2-vessel obstructive coronary artery  disease with acceptable left ventricular function with LVEF 0.75 and subsequent angioplasty and stent deployment (LAD-LCX), April 2001.   • CARDIAC CATHETERIZATION  04/15/2016    75% RCA- 4.0x8 & 2.5x16 Promus   • CARDIAC CATHETERIZATION  04/2001    Cath-PTCA % Stents in LAD & LCX.   • CARDIAC CATHETERIZATION  02/2003    Cath-Brachytherapy to LCX.   • CARDIAC CATHETERIZATION  02/2004    Cath-PTCA & Stent in LCX.   • CARDIAC CATHETERIZATION  03/12/2008    Cath-70% LCX-2.30j31ov Taxus. 75%RCA-3.5x16mm Taxus.   • CARDIOVASCULAR STRESS TEST  03/07/2013    L.Myoview-negative for ischemia   • CARDIOVASCULAR STRESS TEST  05/02/2008    A. Cardiolyte-([facility]) Negative.   • CONVERTED (HISTORICAL) HOLTER  08/05/2006    Holter-Paced rythm at 78BPM.   • ECHO - CONVERTED  01/17/2007    Echo-EF65%. ApicalWMA.   • ECHO - CONVERTED  04/20/2009    Echo-EF>60%   • ECHO - CONVERTED  03/07/2013    Echo-EF 60-65%, RVSP 47mmHg   • ECHO - CONVERTED  10/31/2011    Echo-EF 60%, RVSP-46mmHg, Mod mR.   • ECHO - CONVERTED  07/10/2018    EF 60%. STEPH- 1.33 Cm2. Mod MR. RVSP- 49 mmHg.   • PACEMAKER IMPLANTATION  02/14/2005    Pacemaker-Guidant by Dr. Monzon   • PACEMAKER REPLACEMENT  10/07/2009    Pacemaker replacement of dual chamber pacemaker. Dr. Monzon.       Current Outpatient Medications   Medication Sig Dispense Refill   • acetaminophen (TYLENOL) 500 MG tablet Take 500 mg by mouth every 6 (six) hours as needed for mild pain (1-3).     • acetaminophen (TYLENOL) 650 MG suppository Insert 650 mg into the rectum Every 6 (Six) Hours As Needed for Mild Pain .     • apixaban (ELIQUIS) 2.5 MG tablet tablet Take 2.5 mg by mouth 2 (Two) Times a Day.     • aspirin 81 MG EC tablet Take 81 mg by mouth Daily.     • atorvastatin (LIPITOR) 40 MG tablet Take 40 mg by mouth daily.     • bisacodyl (DULCOLAX) 10 MG suppository Insert 10 mg into the rectum Daily. As needed for constipation.     • carvedilol (COREG) 6.25 MG tablet Take 6.25 mg by mouth 2  (two) times a day with meals.     • Coenzyme Q10 (CO Q10) 60 MG capsule Take  by mouth daily.     • cyanocobalamin 1000 MCG/ML injection Inject 1,000 mcg into the shoulder, thigh, or buttocks every 28 (twenty-eight) days.     • Dextromethorphan-Guaifenesin (ROBITUSSIN DM SUGAR FREE PO) Take  by mouth Every 6 (Six) Hours As Needed.     • docusate sodium (COLACE) 250 MG capsule Take 250 mg by mouth daily.     • famotidine (PEPCID) 40 MG tablet Take 40 mg by mouth daily.     • ferrous sulfate 325 (65 FE) MG tablet Take 325 mg by mouth 2 (Two) Times a Day.     • furosemide (LASIX) 40 MG tablet Take 40 mg by mouth Daily.     • hydrALAZINE (APRESOLINE) 50 MG tablet Take 50 mg by mouth Daily.     • Lactobacillus (ACIDOPHILUS/PECTIN PO) Take 1 tablet by mouth 2 (Two) Times a Day.     • lactulose (CHRONULAC) 10 GM/15ML solution Take 20 g by mouth Daily As Needed.     • levETIRAcetam (KEPPRA) 500 MG tablet Take 500 mg by mouth 2 (Two) Times a Day.     • magnesium hydroxide (MILK OF MAGNESIA) 400 MG/5ML suspension Take  by mouth daily as needed for constipation.     • memantine (NAMENDA) 10 MG tablet Take 10 mg by mouth 2 (Two) Times a Day.     • montelukast (SINGULAIR) 10 MG tablet Take 10 mg by mouth every night.     • ondansetron (ZOFRAN) 4 MG tablet Take 4 mg by mouth Every 8 (Eight) Hours As Needed for Nausea or Vomiting.     • polyethyl glycol-propyl glycol (SYSTANE) 0.4-0.3 % solution ophthalmic solution 1 drop 3 (Three) Times a Day.     • polyethylene glycol (MIRALAX) packet Take 17 g by mouth Daily As Needed.     • promethazine (PHENERGAN) 12.5 MG tablet 12.5 mg every 6 (six) hours as needed for nausea or vomiting.     • sodium chloride (OCEAN) 0.65 % nasal spray 1 spray into each nostril as needed for congestion.     • spironolactone (ALDACTONE) 25 MG tablet Take 25 mg by mouth Daily.       No current facility-administered medications for this visit.        Codeine; Lotrel [amlodipine besy-benazepril hcl]; Mobic  [meloxicam]; Morphine and related; Sular [nisoldipine er]; and Sulfa antibiotics    Past Medical History:   Diagnosis Date   • Abnormal results of thyroid function studies     Remote   • Anemia    • Anxiety    • Asthma    • CAD (coronary artery disease)    • Cardiomegaly    • CHF (congestive heart failure) (CMS/[facility])    • Circumoral numbness     Intermittent transient facial paresthesias- TIA?.    • Complete heart block (CMS/HCC)     w/ normal functioning pacemaker   • COPD (chronic obstructive pulmonary disease) (CMS/[facility])    • Depression    • Diabetes mellitus (CMS/HCC)    • Dyslipidemia    • Exertional dyspnea     Chronic exertional dyspnea and fatigue with acceptable normal low probability lung perfusion/ventilation scan, February 2003.    • Fatigue     Generalized fatigue of unclear etiology, however, this maybe secondary to lung function and advanced age and not due to deficit in cardiac function.  However, we will make arrangements for patient to obtain a 2D echo for evaluation of ejection fraction with Dr. Jerome here in the office.  Otherwise, we will plan on replacing her generator in approximately 1 months time.   • GERD (gastroesophageal reflux disease)    • H/O hiatal hernia     Probable GERD syndrome   • Hiatal hernia    • Hypercholesterolemia    • Hypertension     Chronic hypertension, probably essential with minimal obstructive plaque on selective renal arteriography, February 2003.    • IHD (ischemic heart disease)    • Ischemic heart disease    • Knee pain, left     Recent severe left knee pain, swelling and immobility with serial normal left lower extremity venous duplex studies with apparent abnormal x-ray - data deficit ([facility], May-June 2005).    • Macular degeneration of both eyes    • SOB (shortness of breath)    • IZA (stress urinary incontinence, female)    • Tachyarrhythmia     Intermittent recurrent tachypalpitations with documented rapid symptomatic  "atrial fibrillation with subsequent DDD/R pacemaker implant and AV node ablation (Guidant model #1298), 2005.   • TIA (transient ischemic attack)     Remote TIA with apparent acceptable carotid duplex study and brain MRI scan, summer 2002.    • UTI (urinary tract infection)        Social History     Socioeconomic History   • Marital status:      Spouse name: Not on file   • Number of children: Not on file   • Years of education: Not on file   • Highest education level: Not on file   Tobacco Use   • Smoking status: Former Smoker     Last attempt to quit: 1966     Years since quittin.5   • Smokeless tobacco: Never Used   Substance and Sexual Activity   • Alcohol use: No   • Drug use: No   • Sexual activity: Defer       Family History   Problem Relation Age of Onset   • Diabetes Mother    • Heart disease Mother    • Stroke Mother    • Hypertension Mother        Review of Systems   Constitution: Positive for decreased appetite (\"not as good as used to be\"), weakness, malaise/fatigue and weight loss.   HENT: Negative for congestion and nosebleeds.    Eyes: Negative for pain and redness.   Cardiovascular: Negative for chest pain, leg swelling and palpitations.   Respiratory: Positive for shortness of breath. Negative for cough.    Endocrine: Negative for polydipsia, polyphagia and polyuria.   Hematologic/Lymphatic: Negative for bleeding problem. Does not bruise/bleed easily.   Skin: Negative for flushing and itching.   Musculoskeletal: Positive for joint pain and muscle weakness. Negative for falls and muscle cramps.   Gastrointestinal: Negative for abdominal pain, dysphagia, heartburn and melena.   Genitourinary: Negative for dysuria and hematuria.   Neurological: Positive for numbness (hands).   Psychiatric/Behavioral: Positive for memory loss. The patient is nervous/anxious.         Objective     /74 (BP Location: Left arm)   Pulse 75   Ht 162.6 cm (64\")   Wt 62.1 kg (137 lb) Comment: " Patient was weighed at NH per weight chair 137 lb  BMI 23.52 kg/m²     Physical Exam   Constitutional: She is oriented to person, place, and time. She appears well-nourished.   HENT:   Head: Normocephalic.   Eyes: Pupils are equal, round, and reactive to light.   Neck: Normal range of motion. Carotid bruit is not present.   Cardiovascular: Normal rate, regular rhythm, S1 normal and S2 normal.   Murmur heard.   Diastolic murmur is present with a grade of 3/6.  Pulses:       Radial pulses are 2+ on the right side, and 2+ on the left side.   Pulmonary/Chest: Breath sounds normal.   Abdominal: Soft. Bowel sounds are normal.   Musculoskeletal: Normal range of motion.   Neurological: She is alert and oriented to person, place, and time.   Skin: Skin is warm.   Psychiatric: She has a normal mood and affect. Her speech is normal. Cognition and memory are impaired.          ECG 12 Lead  Date/Time: 2019 7:23 AM  Performed by: Brii Smith APRN  Authorized by: Brii Smith APRN   Comparison: compared with previous ECG from 2019  Comparison to previous ECG: Atrial fib, ventricular paced  Rhythm: atrial fibrillation and paced  BPM: 75  Pacin% capture              Assessment/Plan      Shameka was seen today for follow-up, shortness of breath and med refill.    Diagnoses and all orders for this visit:    Coronary artery disease involving native coronary artery of native heart without angina pectoris    SSS (sick sinus syndrome) (CMS/HCC)    Essential hypertension    Hypercholesteremia    Chronic atrial fibrillation (CMS/HCC)    Current use of long term anticoagulation    Aortic valve insufficiency, etiology of cardiac valve disease unspecified    Other orders  -     ECG 12 Lead    Ms. Aguilera presents today without complaints of chest pain or palpitations.  Shortness of breath is the same.  EKG for management of pacemaker and medications today shows atrial fibrillation with ventricular pacing.  She  follows with EP for management of pacemaker.    Due to stroke risk associated with chronic atrial fibrillation she is on anticoagulation therapy in the form of Eliquis.  She currently denies signs of bleeding or increased bruising.  Advised to continue at renal dose.    Echo done last year showed preserved ejection fraction.  AI was moderate.  She currently has no worsening cardiac symptoms.  No edema.  No repeat cardiac testing advised at this time.    Her blood pressure is stable no change in antihypertensive medications made today.    For hypercholesterolemia she is on statin therapy in form of Lipitor.    She follows with you for management of labs through the nursing home.  No lab orders given at visit today.    Patient's Body mass index is 23.52 kg/m². BMI is within normal parameters. No follow-up required.  Her BMI is in normal range.  I encouraged her on maintaining good dietary intake for  weight maintenance.    A one year follow-up visit scheduled.  Please call sooner for cardiac concerns.              Electronically signed by ALEYDA Winter,  July 11, 2019 7:33 AM

## 2019-10-18 NOTE — PROGRESS NOTES
Shameka Aguilera  5/22/1933  PCP: Bob Alford MD    SUBJECTIVE:   Shameka Aguilera is a 86 y.o. female seen for a follow up visit regarding the following:     Chief Complaint: Follow up for atrial fibrillation    HPI:    Since last visit the patient's status has been stable. Patient with occasional epistaxis. Due to stroke risk, she will continue reduced Eliquis dosing and no aspirin. No further nose bleeds.      History:   This is an 86-year-old patient with a history of permanent atrial fibrillation with heart block.  She underwent pacemaker implantation.    Cardiac PMH: (Old records have been reviewed and summarized below)      Current Outpatient Medications:   •  acetaminophen (TYLENOL) 500 MG tablet, Take 500 mg by mouth every 6 (six) hours as needed for mild pain (1-3)., Disp: , Rfl:   •  acetaminophen (TYLENOL) 650 MG suppository, Insert 650 mg into the rectum Every 6 (Six) Hours As Needed for Mild Pain ., Disp: , Rfl:   •  apixaban (ELIQUIS) 2.5 MG tablet tablet, Take 2.5 mg by mouth 2 (Two) Times a Day., Disp: , Rfl:   •  aspirin 81 MG EC tablet, Take 81 mg by mouth Daily., Disp: , Rfl:   •  atorvastatin (LIPITOR) 40 MG tablet, Take 40 mg by mouth daily., Disp: , Rfl:   •  bisacodyl (DULCOLAX) 10 MG suppository, Insert 10 mg into the rectum Daily. As needed for constipation., Disp: , Rfl:   •  carvedilol (COREG) 6.25 MG tablet, Take 6.25 mg by mouth 2 (two) times a day with meals., Disp: , Rfl:   •  Coenzyme Q10 (CO Q10) 60 MG capsule, Take  by mouth daily., Disp: , Rfl:   •  cyanocobalamin 1000 MCG/ML injection, Inject 1,000 mcg into the shoulder, thigh, or buttocks every 28 (twenty-eight) days., Disp: , Rfl:   •  Dextromethorphan-Guaifenesin (ROBITUSSIN DM SUGAR FREE PO), Take  by mouth Every 6 (Six) Hours As Needed., Disp: , Rfl:   •  docusate sodium (COLACE) 250 MG capsule, Take 250 mg by mouth daily., Disp: , Rfl:   •  famotidine (PEPCID) 40 MG tablet, Take 40 mg by mouth daily., Disp: , Rfl:    •  ferrous sulfate 325 (65 FE) MG tablet, Take 325 mg by mouth 2 (Two) Times a Day., Disp: , Rfl:   •  furosemide (LASIX) 40 MG tablet, Take 40 mg by mouth Daily., Disp: , Rfl:   •  hydrALAZINE (APRESOLINE) 50 MG tablet, Take 50 mg by mouth Daily., Disp: , Rfl:   •  Lactobacillus (ACIDOPHILUS/PECTIN PO), Take 1 tablet by mouth 2 (Two) Times a Day., Disp: , Rfl:   •  lactulose (CHRONULAC) 10 GM/15ML solution, Take 20 g by mouth Daily As Needed., Disp: , Rfl:   •  levETIRAcetam (KEPPRA) 500 MG tablet, Take 500 mg by mouth 2 (Two) Times a Day., Disp: , Rfl:   •  magnesium hydroxide (MILK OF MAGNESIA) 400 MG/5ML suspension, Take  by mouth daily as needed for constipation., Disp: , Rfl:   •  memantine (NAMENDA) 10 MG tablet, Take 10 mg by mouth 2 (Two) Times a Day., Disp: , Rfl:   •  montelukast (SINGULAIR) 10 MG tablet, Take 10 mg by mouth every night., Disp: , Rfl:   •  ondansetron (ZOFRAN) 4 MG tablet, Take 4 mg by mouth Every 8 (Eight) Hours As Needed for Nausea or Vomiting., Disp: , Rfl:   •  polyethyl glycol-propyl glycol (SYSTANE) 0.4-0.3 % solution ophthalmic solution, 1 drop 3 (Three) Times a Day., Disp: , Rfl:   •  polyethylene glycol (MIRALAX) packet, Take 17 g by mouth Daily As Needed., Disp: , Rfl:   •  promethazine (PHENERGAN) 12.5 MG tablet, 12.5 mg every 6 (six) hours as needed for nausea or vomiting., Disp: , Rfl:   •  sodium chloride (OCEAN) 0.65 % nasal spray, 1 spray into each nostril as needed for congestion., Disp: , Rfl:   •  spironolactone (ALDACTONE) 25 MG tablet, Take 25 mg by mouth Daily., Disp: , Rfl:     Past Medical History, Past Surgical History, Family history, Social History, and Medications were all reviewed with the patient today and updated as necessary.       Patient Active Problem List   Diagnosis   • Permanent atrial fibrillation   • Coronary artery disease involving native coronary artery of native heart without angina pectoris   • Chronic fatigue   • Essential hypertension   •  Hypercholesteremia   • SSS (sick sinus syndrome) (CMS/HCC)   • Ischemic heart disease   • H/O hiatal hernia   • Macular degeneration of both eyes   • Exertional dyspnea   • Tachyarrhythmia   • Complete heart block (CMS/HCC)   • Chronic atrial fibrillation   • Pacemaker   • Current use of long term anticoagulation     Allergies   Allergen Reactions   • Codeine      Nausea and vomiting   • Lotrel [Amlodipine Besy-Benazepril Hcl]      cough   • Mobic [Meloxicam] Other (See Comments)     Unable to recall reaction   • Morphine And Related Confusion   • Sular [Nisoldipine Er] Other (See Comments)     Unable to recall reaction   • Sulfa Antibiotics Nausea And Vomiting     Past Medical History:   Diagnosis Date   • Abnormal results of thyroid function studies     Remote   • Anemia    • Anxiety    • Asthma    • CAD (coronary artery disease)    • Cardiomegaly    • CHF (congestive heart failure) (CMS/[facility])    • Circumoral numbness     Intermittent transient facial paresthesias- TIA?.    • Complete heart block (CMS/HCC)     w/ normal functioning pacemaker   • COPD (chronic obstructive pulmonary disease) (CMS/[facility])    • Depression    • Diabetes mellitus (CMS/[facility])    • Dyslipidemia    • Exertional dyspnea     Chronic exertional dyspnea and fatigue with acceptable normal low probability lung perfusion/ventilation scan, February 2003.    • Fatigue     Generalized fatigue of unclear etiology, however, this maybe secondary to lung function and advanced age and not due to deficit in cardiac function.  However, we will make arrangements for patient to obtain a 2D echo for evaluation of ejection fraction with Dr. Jerome here in the office.  Otherwise, we will plan on replacing her generator in approximately 1 months time.   • GERD (gastroesophageal reflux disease)    • H/O hiatal hernia     Probable GERD syndrome   • Hiatal hernia    • Hypercholesterolemia    • Hypertension     Chronic hypertension, probably essential with minimal  obstructive plaque on selective renal arteriography, February 2003.    • IHD (ischemic heart disease)    • Ischemic heart disease    • Knee pain, left     Recent severe left knee pain, swelling and immobility with serial normal left lower extremity venous duplex studies with apparent abnormal x-ray - data deficit ([facility], May-June 2005).    • Macular degeneration of both eyes    • SOB (shortness of breath)    • IZA (stress urinary incontinence, female)    • Tachyarrhythmia     Intermittent recurrent tachypalpitations with documented rapid symptomatic atrial fibrillation with subsequent DDD/R pacemaker implant and AV node ablation (Guidant model #1298), February 2005.   • TIA (transient ischemic attack)     Remote TIA with apparent acceptable carotid duplex study and brain MRI scan, summer 2002.    • UTI (urinary tract infection)      Past Surgical History:   Procedure Laterality Date   • ANGIOPLASTY      Remote, atypical, disabling chest pain syndrome with catheterization studies demonstrating severe 2-vessel obstructive coronary artery disease with acceptable left ventricular function with LVEF 0.75 and subsequent angioplasty and stent deployment (LAD-LCX), April 2001.   • CARDIAC CATHETERIZATION  04/15/2016    75% RCA- 4.0x8 & 2.5x16 Promus   • CARDIAC CATHETERIZATION  04/2001    Cath-PTCA % Stents in LAD & LCX.   • CARDIAC CATHETERIZATION  02/2003    Cath-Brachytherapy to LCX.   • CARDIAC CATHETERIZATION  02/2004    Cath-PTCA & Stent in LCX.   • CARDIAC CATHETERIZATION  03/12/2008    Cath-70% LCX-2.29a00gr Taxus. 75%RCA-3.5x16mm Taxus.   • CARDIOVASCULAR STRESS TEST  03/07/2013    L.Myoview-negative for ischemia   • CARDIOVASCULAR STRESS TEST  05/02/2008    A. Cardiolyte-([facility]) Negative.   • CONVERTED (HISTORICAL) HOLTER  08/05/2006    Holter-Paced rythm at 78BPM.   • ECHO - CONVERTED  01/17/2007    Echo-EF65%. ApicalWMA.   • ECHO - CONVERTED  04/20/2009    Echo-EF>60%   • ECHO -  "CONVERTED  2013    Echo-EF 60-65%, RVSP 47mmHg   • ECHO - CONVERTED  10/31/2011    Echo-EF 60%, RVSP-46mmHg, Mod mR.   • ECHO - CONVERTED  07/10/2018    EF 60%. STEPH- 1.33 Cm2. Mod MR. RVSP- 49 mmHg.   • PACEMAKER IMPLANTATION  2005    Pacemaker-Guidant by Dr. Monzon   • PACEMAKER REPLACEMENT  10/07/2009    Pacemaker replacement of dual chamber pacemaker. Dr. Monzon.     Family History   Problem Relation Age of Onset   • Diabetes Mother    • Heart disease Mother    • Stroke Mother    • Hypertension Mother      Social History     Tobacco Use   • Smoking status: Former Smoker     Last attempt to quit: 1966     Years since quittin.8   • Smokeless tobacco: Never Used   Substance Use Topics   • Alcohol use: No         PHYSICAL EXAM:    /72 (BP Location: Left arm, Patient Position: Sitting)   Pulse 74   Ht 165.1 cm (65\")   Wt 65.8 kg (145 lb)   SpO2 99%   BMI 24.13 kg/m²        Wt Readings from Last 5 Encounters:   10/18/19 65.8 kg (145 lb)   19 62.1 kg (137 lb)   19 66.2 kg (146 lb)   18 66.7 kg (147 lb)   18 68 kg (150 lb)       BP Readings from Last 5 Encounters:   10/18/19 128/72   19 126/74   03/15/19 110/60   19 128/70   18 122/70       General-Well Nourished, Well developed  Eyes - PERRLA  Neck- supple, No mass  CV- regular rate and rhythm, no MRG, No edema  Lung- clear bilaterally  Abd- soft, +BS  Musc/skel - Norm strength and range of motion  Skin- warm and dry  Neuro - Alert & Oriented x 3, appropriate mood.        Medical problems and test results were reviewed with the patient today.     No results found for this or any previous visit (from the past 672 hour(s)).      EKG: (EKG has been independently visualized by me and summarized below)    Procedures     ASSESSMENT and PLAN  1. Perm Atrial Fibrillation with CHB s/p PPM. 100% RV paced. On Eliquis 2.5 mg BID.   2. HTN - at goal. Continue on current Rx  3. Pacemaker - Stable function - " Adjusted rate response - Norm function otherwise. Turned on Minute vent rate response  4. CVA - On Eliquis 2.5mg        Return in about 1 year (around 10/18/2020) [facility].        Kyle Linda M.D., FCHAR.C, F.H.R.S.  Cardiology/Electrophysiology  10/18/2019  2:35 PM

## 2020-01-01 ENCOUNTER — TELEPHONE (OUTPATIENT)
Dept: CARDIOLOGY | Facility: CLINIC | Age: 85
End: 2020-01-01

## 2020-07-06 NOTE — TELEPHONE ENCOUNTER
Saima from [facility] called. Pt is now with hopice, does she need to try to keep appointment on 7/9/20 or is it ok to cancel?